# Patient Record
Sex: FEMALE | Race: WHITE
[De-identification: names, ages, dates, MRNs, and addresses within clinical notes are randomized per-mention and may not be internally consistent; named-entity substitution may affect disease eponyms.]

---

## 2022-05-26 PROBLEM — Z00.00 ENCOUNTER FOR PREVENTIVE HEALTH EXAMINATION: Status: ACTIVE | Noted: 2022-05-26

## 2023-09-07 ENCOUNTER — APPOINTMENT (OUTPATIENT)
Dept: OPHTHALMOLOGY | Facility: CLINIC | Age: 78
End: 2023-09-07
Payer: MEDICARE

## 2023-09-07 ENCOUNTER — NON-APPOINTMENT (OUTPATIENT)
Age: 78
End: 2023-09-07

## 2023-09-07 PROCEDURE — 92004 COMPRE OPH EXAM NEW PT 1/>: CPT

## 2023-09-07 PROCEDURE — 92133 CPTRZD OPH DX IMG PST SGM ON: CPT

## 2023-10-11 ENCOUNTER — APPOINTMENT (OUTPATIENT)
Dept: OPHTHALMOLOGY | Facility: CLINIC | Age: 78
End: 2023-10-11

## 2023-10-19 ENCOUNTER — NON-APPOINTMENT (OUTPATIENT)
Age: 78
End: 2023-10-19

## 2023-10-19 ENCOUNTER — APPOINTMENT (OUTPATIENT)
Dept: ORTHOPEDIC SURGERY | Facility: CLINIC | Age: 78
End: 2023-10-19
Payer: MEDICARE

## 2023-10-19 VITALS — BODY MASS INDEX: 20.04 KG/M2 | HEIGHT: 70 IN | WEIGHT: 140 LBS

## 2023-10-19 DIAGNOSIS — M25.561 PAIN IN RIGHT KNEE: ICD-10-CM

## 2023-10-19 DIAGNOSIS — M25.562 PAIN IN RIGHT KNEE: ICD-10-CM

## 2023-10-19 DIAGNOSIS — M17.11 UNILATERAL PRIMARY OSTEOARTHRITIS, RIGHT KNEE: ICD-10-CM

## 2023-10-19 PROCEDURE — 73564 X-RAY EXAM KNEE 4 OR MORE: CPT | Mod: 50

## 2023-10-19 PROCEDURE — 99204 OFFICE O/P NEW MOD 45 MIN: CPT

## 2023-10-19 RX ORDER — HYALURONATE SODIUM 20 MG/2 ML
20 SYRINGE (ML) INTRAARTICULAR
Qty: 1 | Refills: 0 | Status: ACTIVE | OUTPATIENT
Start: 2023-10-19

## 2023-10-25 ENCOUNTER — APPOINTMENT (OUTPATIENT)
Dept: OPHTHALMOLOGY | Facility: CLINIC | Age: 78
End: 2023-10-25

## 2023-11-06 ENCOUNTER — APPOINTMENT (OUTPATIENT)
Dept: ORTHOPEDIC SURGERY | Facility: CLINIC | Age: 78
End: 2023-11-06

## 2023-11-13 ENCOUNTER — APPOINTMENT (OUTPATIENT)
Dept: ORTHOPEDIC SURGERY | Facility: CLINIC | Age: 78
End: 2023-11-13
Payer: MEDICARE

## 2023-11-13 PROCEDURE — 20610 DRAIN/INJ JOINT/BURSA W/O US: CPT | Mod: LT

## 2023-11-20 ENCOUNTER — APPOINTMENT (OUTPATIENT)
Dept: ORTHOPEDIC SURGERY | Facility: CLINIC | Age: 78
End: 2023-11-20
Payer: MEDICARE

## 2023-11-20 PROCEDURE — 20610 DRAIN/INJ JOINT/BURSA W/O US: CPT | Mod: LT

## 2023-11-27 ENCOUNTER — APPOINTMENT (OUTPATIENT)
Dept: ORTHOPEDIC SURGERY | Facility: CLINIC | Age: 78
End: 2023-11-27
Payer: MEDICARE

## 2023-11-27 PROCEDURE — 20610 DRAIN/INJ JOINT/BURSA W/O US: CPT | Mod: LT

## 2024-01-08 ENCOUNTER — APPOINTMENT (OUTPATIENT)
Dept: OPHTHALMOLOGY | Facility: CLINIC | Age: 79
End: 2024-01-08

## 2024-01-11 NOTE — PHYSICAL EXAM
[de-identified] : Right lower extremity Knee Inspection: no effusion Wounds: None Alignment: Valgus Palpation: Nontender to palpation ROM active (in degrees):  with crepitus/pain through the arc of motion Ligamentous laxity: Laxity with varus valgus stress Meniscal Test: Negative McMurrays, negative Rom. Muscle Test: good quad strength.  Left lower extremity Knee Inspection: no effusion Wounds: None Alignment: Valgus Palpation: Nontender to palpation ROM active (in degrees):  with crepitus/pain through the arc of motion Ligamentous laxity: Laxity with varus valgus stress Meniscal Test: Negative McMurrays, negative Rom. Muscle Test: good quad strength. [de-identified] : 10/19/23: Bilateral knee xrays, standing AP/Lateral and Merchant films, and 45 degree PA standing view are reviewed and demonstrate diffuse tricompartmental degenerative arthritis, lateral joint space narrowing, significant valgus deformities, marginal osteophytes, bone on bone with sclerosis, patellofemoral joint space narrowing

## 2024-01-11 NOTE — HISTORY OF PRESENT ILLNESS
[de-identified] : 10/19/23: Patient presents with bilateral knee pain right worse than left.  Is been going on for many years.  States she has had injections in the past, believes they were steroids about 3 months ago but provided no relief recently.  They did used to work.  Taking Tylenol for the pain.  Lives in an assisted living.  States she has not ambulated for about a year, has been using a wheelchair due to the knee pain.  Denies allergies anticoagulation tobacco use, past medical history -A-fib.

## 2024-01-11 NOTE — DISCUSSION/SUMMARY
[de-identified] : Right knee arthritis with limitations of activities of daily living and conservative treatment options failing to improve disability. Left knee arthritis not as severe, continue conservative treatment.  We will order gel injections, patient will return once these are approved.  Plan: Right total Knee Arthroplasty  The patient has arthritis/ end stage joint disease of the knee supported by x-ray or MRI that demonstrated one of the following:  periarticular osteophytes; joint space narrowing; subchondral cysts; subchondral sclerosis;  bone on bone articulation; .   One or more of the following conservative treatments have been tried and failed for 3 months or more: analgesic medication; home exercises; brace; cortisone shots; anti-inflammatory medication; physical therapy; use of walker or cane; visco-supplementation therapies  We discussed the natural history of knee arthritis and the potential treatment options. The importance of diet and exercise was discussed as excess weight is a significant contributing factor. Due to the pain, failure of prior nonoperative treatment, the patient is indicated for a total knee replacement.   A risk/benefit analysis was discussed with the patient reviewing the advantages and disadvantages of surgical intervention at this time. Both the level and length of the patient's pain have made additional conservative treatment measures contraindicated. A full explanation was given of the nature and the purpose of the procedure and anesthesia, its benefits, possible alternative methods of treatment, the risks involved, the possibility of complications, the foreseeable consequences of the procedure and the possible results of the non-treatment. I reviewed the plan of care as well as a model of a total knee implant equivalent to the one that will be used for their replacement. The patient agrees with the plan of care as well as the use of implants for their total knee replacement.   The potential biologic and mechanical risks of the procedure were discussed. This discussion included but was not limited to thromboembolic disease, fracture, loss of fixation, infection, leg length discrepancy, dislocation and neurovascular injury. The patient is also understands that anesthesia and their comorbidities present additional risks. Proper expectations were addressed as this surgery may only partially or even fail to relieve their pain. The patient understands that additional surgery may be needed during the perioperative period or in the future. We discussed the durability of prosthetic knees and limitations related to wear, osteolysis and loosening. All questions were answered to the patient's satisfaction. The patient was given my packet of additional information about the procedure.  Expect 1-2 day stay in hospital as this is less than standard across the country.  Although outpatient surgery may be feasible in carefully selected heathy patients - the definition of a "healthy" patient to do this in has not been properly studied in randomized trials.  This hospital time is important to observe for urinary retention, neurologic decline, cardiopulmonary issues, and signs of blood clot which are frequent early complications of joint replacements.  This time will also be used to work with PT so they are safe to navigate without falling which could lead to fracture and more surgery.   There is no evidence for any of the following contraindications for total joint replacement surgery:  active infection; active systemic bacteremia; active skin infection or open wound at surgical site; neuropathic arthritis; severe, rapidly progressive neurologic disease; severe medical conditions that makes the risks of surgery outweigh the potential benefit.  The knee pain is affecting the ability to perform activities of daily living despite activity modifications.  The painful knee exam and the radiographic findings of cartilage loss are consistent with the knee OA as the source of the disability and pain.  We discussed that her valgus deformity does put her at increased risk of a peroneal nerve palsy that could result in foot drop.  We discussed this may take 6 months to a year to resolve or may never fully resolve if it does occur.  We also discussed that her with being wheelchair-bound for a year puts her at risk of poor outcomes including decreased range of motion and weakness.  I do believe the knee replacement will still help her however I discussed she will need extensive strengthening and physical therapy postoperatively and cannot expect the typical results of a knee replacement.  Patient understands this.  I am recommending the ROM tech Portable Connect device as it is medically necessary for the patient's surgical recovery to provide an optimal outcome.  Surgery will be scheduled at a convenient time.   DME: ROM Tech Preop dental, medical, cardiology clearance.  Postop DVT ppx: Per Caprini Score Abx ppx: Ancef Dressing: Prineo/Meplilex

## 2024-01-22 ENCOUNTER — APPOINTMENT (OUTPATIENT)
Dept: CARDIOLOGY | Facility: CLINIC | Age: 79
End: 2024-01-22
Payer: COMMERCIAL

## 2024-01-22 VITALS
SYSTOLIC BLOOD PRESSURE: 122 MMHG | HEIGHT: 70 IN | DIASTOLIC BLOOD PRESSURE: 90 MMHG | WEIGHT: 143 LBS | BODY MASS INDEX: 20.47 KG/M2

## 2024-01-22 DIAGNOSIS — I48.91 UNSPECIFIED ATRIAL FIBRILLATION: ICD-10-CM

## 2024-01-22 PROCEDURE — 99204 OFFICE O/P NEW MOD 45 MIN: CPT | Mod: 25

## 2024-01-22 PROCEDURE — 93000 ELECTROCARDIOGRAM COMPLETE: CPT

## 2024-01-22 RX ORDER — ACETAMINOPHEN 500 MG/1
TABLET ORAL
Refills: 0 | Status: ACTIVE | COMMUNITY

## 2024-01-22 RX ORDER — OMEGA-3/DHA/EPA/FISH OIL 910-1400MG
CAPSULE ORAL
Refills: 0 | Status: ACTIVE | COMMUNITY

## 2024-01-22 RX ORDER — CHOLECALCIFEROL (VITAMIN D3) 125 MCG
50 MCG TABLET ORAL
Refills: 0 | Status: ACTIVE | COMMUNITY

## 2024-01-22 RX ORDER — METOPROLOL SUCCINATE 25 MG/1
25 TABLET, EXTENDED RELEASE ORAL
Refills: 0 | Status: ACTIVE | COMMUNITY

## 2024-01-22 RX ORDER — APIXABAN 5 MG/1
5 TABLET, FILM COATED ORAL
Refills: 0 | Status: ACTIVE | COMMUNITY

## 2024-01-22 RX ORDER — SERTRALINE HYDROCHLORIDE 25 MG/1
TABLET, FILM COATED ORAL
Refills: 0 | Status: ACTIVE | COMMUNITY

## 2024-01-22 RX ORDER — LACTULOSE 10 G/15ML
10 SOLUTION ORAL; RECTAL
Refills: 0 | Status: ACTIVE | COMMUNITY

## 2024-01-22 RX ORDER — POLYETHYLENE GLYCOL 3350 17 G/17G
17 POWDER, FOR SOLUTION ORAL
Refills: 0 | Status: ACTIVE | COMMUNITY

## 2024-01-22 NOTE — PHYSICAL EXAM
[Well Developed] : well developed [Well Nourished] : well nourished [No Acute Distress] : no acute distress [Normal Conjunctiva] : normal conjunctiva [Normal Venous Pressure] : normal venous pressure [No Carotid Bruit] : no carotid bruit [Normal S1, S2] : normal S1, S2 [No Murmur] : no murmur [No Rub] : no rub [No Gallop] : no gallop [Clear Lung Fields] : clear lung fields [Good Air Entry] : good air entry [No Respiratory Distress] : no respiratory distress  [Soft] : abdomen soft [Non Tender] : non-tender [No Masses/organomegaly] : no masses/organomegaly [Normal Bowel Sounds] : normal bowel sounds [No Edema] : no edema [No Cyanosis] : no cyanosis [No Clubbing] : no clubbing [No Varicosities] : no varicosities [No Rash] : no rash [No Skin Lesions] : no skin lesions [Moves all extremities] : moves all extremities [No Focal Deficits] : no focal deficits [Normal Speech] : normal speech [Alert and Oriented] : alert and oriented [Normal memory] : normal memory [de-identified] : ambulates with wheelchair

## 2024-01-22 NOTE — DISCUSSION/SUMMARY
[FreeTextEntry1] : Pt is a 77 y/o F PMH AF on Eliquis, PVD, [place] (states she had a fall about 2 yrs ago and had no one to take care of her).  Pt is a poor historian - history obtained from chart that aid brings She is scheduled for R knee replacement 02/02/2024 at .   Will check transthoracic echocardiogram to evaluate left ventricular function and assess for any structural abnormalities  check pharm nuc stress test to eval for ischemia Further preop recommendations will be made once diagnostic testing is complete.   AF: c/w Eliquis for AC c/w metoprolol for rate control will try to get any previous records  The described plan was discussed with the pt and aide.  All questions and concerns were addressed to the best of my knowledge.  [EKG obtained to assist in diagnosis and management of assessed problem(s)] : EKG obtained to assist in diagnosis and management of assessed problem(s)

## 2024-01-22 NOTE — HISTORY OF PRESENT ILLNESS
[FreeTextEntry1] : Pt is a 79 y/o F who is referred here today by their PCP for evaluation. Pt has PMH AF on Elitaye, PVD, [place] (states she had a fall about 2 yrs ago and had no one to take care of her).  Pt is a poor historian - history obtained from chart that aid brings She is scheduled for R knee replacement 02/02/2024 at .  She tells me that she is feeling well overall - denies CP, SOB, diaphoresis, palpitations, dizziness, syncope, LE edema, PND, orthopnea.  She also mentions that she saw a cardiologist 6 months ago but does not remember name ambulates with wheelchair   PCP Dr Lauren Family hx: no cardiac disease Smoking status: quit 2022 ETOH abuse quit 2022 drank wine no drug use Current exercise: none Daily water intake: 1.5 glasses  Previous cardiac testing: unknown Previous hospitalizations/surgeries: ulnar nerve surgery - no problems with anesthesia 3 children - no problem with pregnancies

## 2024-01-23 ENCOUNTER — APPOINTMENT (OUTPATIENT)
Dept: CARDIOLOGY | Facility: CLINIC | Age: 79
End: 2024-01-23
Payer: COMMERCIAL

## 2024-01-23 PROCEDURE — 93306 TTE W/DOPPLER COMPLETE: CPT

## 2024-02-07 ENCOUNTER — RESULT REVIEW (OUTPATIENT)
Age: 79
End: 2024-02-07

## 2024-02-07 ENCOUNTER — OUTPATIENT (OUTPATIENT)
Dept: OUTPATIENT SERVICES | Facility: HOSPITAL | Age: 79
LOS: 1 days | End: 2024-02-07
Payer: MEDICARE

## 2024-02-07 VITALS
TEMPERATURE: 98 F | DIASTOLIC BLOOD PRESSURE: 90 MMHG | HEART RATE: 74 BPM | RESPIRATION RATE: 18 BRPM | HEIGHT: 69 IN | SYSTOLIC BLOOD PRESSURE: 143 MMHG | OXYGEN SATURATION: 97 % | WEIGHT: 132.06 LBS

## 2024-02-07 DIAGNOSIS — Z89.429 ACQUIRED ABSENCE OF OTHER TOE(S), UNSPECIFIED SIDE: Chronic | ICD-10-CM

## 2024-02-07 DIAGNOSIS — Z98.890 OTHER SPECIFIED POSTPROCEDURAL STATES: Chronic | ICD-10-CM

## 2024-02-07 DIAGNOSIS — M17.11 UNILATERAL PRIMARY OSTEOARTHRITIS, RIGHT KNEE: ICD-10-CM

## 2024-02-07 DIAGNOSIS — Z29.9 ENCOUNTER FOR PROPHYLACTIC MEASURES, UNSPECIFIED: ICD-10-CM

## 2024-02-07 DIAGNOSIS — Z01.818 ENCOUNTER FOR OTHER PREPROCEDURAL EXAMINATION: ICD-10-CM

## 2024-02-07 LAB
A1C WITH ESTIMATED AVERAGE GLUCOSE RESULT: 5.1 % — SIGNIFICANT CHANGE UP (ref 4–5.6)
ALBUMIN SERPL ELPH-MCNC: 4.2 G/DL — SIGNIFICANT CHANGE UP (ref 3.3–5)
BASOPHILS # BLD AUTO: 0.07 K/UL — SIGNIFICANT CHANGE UP (ref 0–0.2)
BASOPHILS NFR BLD AUTO: 1.2 % — SIGNIFICANT CHANGE UP (ref 0–2)
BUN SERPL-MCNC: 30 MG/DL — HIGH (ref 7–23)
CALCIUM SERPL-MCNC: 9.7 MG/DL — SIGNIFICANT CHANGE UP (ref 8.5–10.1)
CHLORIDE SERPL-SCNC: 104 MMOL/L — SIGNIFICANT CHANGE UP (ref 96–108)
CO2 SERPL-SCNC: 31 MMOL/L — SIGNIFICANT CHANGE UP (ref 22–31)
CREAT SERPL-MCNC: 1.14 MG/DL — SIGNIFICANT CHANGE UP (ref 0.5–1.3)
EGFR: 50 ML/MIN/1.73M2 — LOW
EOSINOPHIL # BLD AUTO: 0.14 K/UL — SIGNIFICANT CHANGE UP (ref 0–0.5)
EOSINOPHIL NFR BLD AUTO: 2.3 % — SIGNIFICANT CHANGE UP (ref 0–6)
ESTIMATED AVERAGE GLUCOSE: 100 MG/DL — SIGNIFICANT CHANGE UP (ref 68–114)
GLUCOSE SERPL-MCNC: 104 MG/DL — HIGH (ref 70–99)
HCT VFR BLD CALC: 41.3 % — SIGNIFICANT CHANGE UP (ref 34.5–45)
HGB BLD-MCNC: 13.9 G/DL — SIGNIFICANT CHANGE UP (ref 11.5–15.5)
IMM GRANULOCYTES NFR BLD AUTO: 0.2 % — SIGNIFICANT CHANGE UP (ref 0–0.9)
INR BLD: 1.8 RATIO — HIGH (ref 0.85–1.18)
LYMPHOCYTES # BLD AUTO: 1.13 K/UL — SIGNIFICANT CHANGE UP (ref 1–3.3)
LYMPHOCYTES # BLD AUTO: 18.8 % — SIGNIFICANT CHANGE UP (ref 13–44)
MCHC RBC-ENTMCNC: 30.7 PG — SIGNIFICANT CHANGE UP (ref 27–34)
MCHC RBC-ENTMCNC: 33.7 GM/DL — SIGNIFICANT CHANGE UP (ref 32–36)
MCV RBC AUTO: 91.2 FL — SIGNIFICANT CHANGE UP (ref 80–100)
MONOCYTES # BLD AUTO: 0.44 K/UL — SIGNIFICANT CHANGE UP (ref 0–0.9)
MONOCYTES NFR BLD AUTO: 7.3 % — SIGNIFICANT CHANGE UP (ref 2–14)
NEUTROPHILS # BLD AUTO: 4.22 K/UL — SIGNIFICANT CHANGE UP (ref 1.8–7.4)
NEUTROPHILS NFR BLD AUTO: 70.2 % — SIGNIFICANT CHANGE UP (ref 43–77)
PLATELET # BLD AUTO: 195 K/UL — SIGNIFICANT CHANGE UP (ref 150–400)
POTASSIUM SERPL-MCNC: 4.4 MMOL/L — SIGNIFICANT CHANGE UP (ref 3.5–5.3)
POTASSIUM SERPL-SCNC: 4.4 MMOL/L — SIGNIFICANT CHANGE UP (ref 3.5–5.3)
PROTHROM AB SERPL-ACNC: 20 SEC — HIGH (ref 9.5–13)
RBC # BLD: 4.53 M/UL — SIGNIFICANT CHANGE UP (ref 3.8–5.2)
RBC # FLD: 12.9 % — SIGNIFICANT CHANGE UP (ref 10.3–14.5)
SODIUM SERPL-SCNC: 135 MMOL/L — SIGNIFICANT CHANGE UP (ref 135–145)
WBC # BLD: 6.01 K/UL — SIGNIFICANT CHANGE UP (ref 3.8–10.5)
WBC # FLD AUTO: 6.01 K/UL — SIGNIFICANT CHANGE UP (ref 3.8–10.5)

## 2024-02-07 PROCEDURE — 85025 COMPLETE CBC W/AUTO DIFF WBC: CPT

## 2024-02-07 PROCEDURE — 93010 ELECTROCARDIOGRAM REPORT: CPT

## 2024-02-07 PROCEDURE — 71045 X-RAY EXAM CHEST 1 VIEW: CPT

## 2024-02-07 PROCEDURE — 87641 MR-STAPH DNA AMP PROBE: CPT

## 2024-02-07 PROCEDURE — 71045 X-RAY EXAM CHEST 1 VIEW: CPT | Mod: 26

## 2024-02-07 PROCEDURE — 87640 STAPH A DNA AMP PROBE: CPT

## 2024-02-07 PROCEDURE — 80048 BASIC METABOLIC PNL TOTAL CA: CPT

## 2024-02-07 PROCEDURE — 36415 COLL VENOUS BLD VENIPUNCTURE: CPT

## 2024-02-07 PROCEDURE — 99214 OFFICE O/P EST MOD 30 MIN: CPT | Mod: 25

## 2024-02-07 PROCEDURE — 82040 ASSAY OF SERUM ALBUMIN: CPT

## 2024-02-07 PROCEDURE — 93005 ELECTROCARDIOGRAM TRACING: CPT

## 2024-02-07 PROCEDURE — 85610 PROTHROMBIN TIME: CPT

## 2024-02-07 PROCEDURE — 83036 HEMOGLOBIN GLYCOSYLATED A1C: CPT

## 2024-02-07 NOTE — H&P PST ADULT - NSANTHOSAYNRD_GEN_A_CORE
No. HERMILO screening performed.  STOP BANG Legend: 0-2 = LOW Risk; 3-4 = INTERMEDIATE Risk; 5-8 = HIGH Risk

## 2024-02-07 NOTE — H&P PST ADULT - HISTORY OF PRESENT ILLNESS
78 years old female with osteoarthritis of right knee. Poor historian. History of HTN, HLD , AFib , Cognitive communication deficit. Reports pain to both knees. Pain with weight bearing. Uses a wheelchair. Reports frequent falls in the past. Presently resides in a multi care nursing facility after fall. Planned right knee replacement.

## 2024-02-07 NOTE — H&P PST ADULT - NSICDXPASTMEDICALHX_GEN_ALL_CORE_FT
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

## 2024-02-07 NOTE — H&P PST ADULT - CARDIOVASCULAR COMMENTS
HTN, HLD, Atrial fibrillation . PVD- left fifth metatarsal amputation cardiac evaluation done by Dr. Reed

## 2024-02-07 NOTE — H&P PST ADULT - ASSESSMENT
78 years old female present to PST prior to  right total knee replacement with Dr. Elizondo. Patient in a rehab facility which uses [facility].  Spoke with Jeimy the nurse at the facility and medications verified.     Patient will require a rolling walker at home due to their dx of osteoarthritis of knee to help complete the MRADL's.    Patient denies signs and symptoms of Covid 19 such as shortness of breath/ difficulty breathing, fever/chills, cough, muscle /body ache, headache, loss of taste or smell.    Plan   1. Education and Instructions given to patient regarding upcoming surgery  2. NPO as per ASU  3. Take the following medications with sips of water on the day of procedure:  Metoprolol, Sertraline  4. Use E-Z sponge as directed  5. Use Mupirocin as directed.  6. Drink a quart of extra  fluids the day before your surgery.  7. Medical optimization for surgery with Dr. Lauren (Rehab facility) and cardiac evaluation with Dr. Reed  8. CBC, BMP, HGB A1C, Albumin, PT/ INR and PTT, MRSA done in [facility] and sent to lab  9. EKG and Chest x- ray done in [facility]  10. Do not take NSAIDS, Aspirin, Herbal supplements, Multivitamins, Vitamin E or Fish oil 7 days prior to surgery  11. Eliquis and Aspirin managed by Dr. Rede  CAPMICHAELI SCORE [CLOT]    AGE RELATED RISK FACTORS                                                       MOBILITY RELATED FACTORS  [ ] Age 41-60 years                                            (1 Point)                  [ ] Bed rest                                                        (1 Point)  [ ] Age: 61-74 years                                           (2 Points)                 [ ] Plaster cast                                                   (2 Points)  [x ] Age> 75 years                                              (3 Points)                 [ ] Bed bound for more than 72 hours                 (2 Points)    DISEASE RELATED RISK FACTORS                                               GENDER SPECIFIC FACTORS  [ ] Edema in the lower extremities                       (1 Point)                  [ ] Pregnancy                                                     (1 Point)  [ ] Varicose veins                                               (1 Point)                  [ ] Post-partum < 6 weeks                                   (1 Point)             [ ] BMI > 25 Kg/m2                                            (1 Point)                  [ ] Hormonal therapy  or oral contraception          (1 Point)                 [ ] Sepsis (in the previous month)                        (1 Point)                  [ ] History of pregnancy complications                 (1 point)  [ ] Pneumonia or serious lung disease                                               [ ] Unexplained or recurrent                     (1 Point)           (in the previous month)                               (1 Point)  [ ] Abnormal pulmonary function test                     (1 Point)                 SURGERY RELATED RISK FACTORS  [ ] Acute myocardial infarction                              (1 Point)                 [ ]  Section                                             (1 Point)  [ ] Congestive heart failure (in the previous month)  (1 Point)               [ ] Minor surgery                                                  (1 Point)   [ ] Inflammatory bowel disease                             (1 Point)                 [ ] Arthroscopic surgery                                        (2 Points)  [ ] Central venous access                                      (2 Points)                [ ] General surgery lasting more than 45 minutes   (2 Points)       [ ] Stroke (in the previous month)                          (5 Points)               [x ] Elective arthroplasty                                         (5 Points)            [ ] malignancy present or previous                      (2 points)                                                                                                                                   HEMATOLOGY RELATED FACTORS                                                 TRAUMA RELATED RISK FACTORS  [ ] Prior episodes of VTE                                     (3 Points)                 [ ] Fracture of the hip, pelvis, or leg                       (5 Points)  [ ] Positive family history for VTE                         (3 Points)                 [ ] Acute spinal cord injury (in the previous month)  (5 Points)  [ ] Prothrombin 02538 A                                     (3 Points)                 [ ] Paralysis  (less than 1 month)                             (5 Points)  [ ] Factor V Leiden                                             (3 Points)                  [ ] Multiple Trauma within 1 month                        (5 Points)  [ ] Lupus anticoagulants                                     (3 Points)                                                           [ ] Anticardiolipin antibodies                               (3 Points)                                                       [ ] High homocysteine in the blood                      (3 Points)                                             [ ] Other congenital or acquired thrombophilia      (3 Points)                                                [ ] Heparin induced thrombocytopenia                  (3 Points)                                          Total Score [    8      ]

## 2024-02-07 NOTE — H&P PST ADULT - NSICDXFAMILYHX_GEN_ALL_CORE_FT
FAMILY HISTORY:  Father  Still living? No  Family hx of alcoholism, Age at diagnosis: Age Unknown    Sibling  Still living? Yes, Estimated age: Age Unknown  Family history of heart disease, Age at diagnosis: Age Unknown    Child  Still living? No  Family history of heart attack, Age at diagnosis: Age Unknown

## 2024-02-08 DIAGNOSIS — Z01.818 ENCOUNTER FOR OTHER PREPROCEDURAL EXAMINATION: ICD-10-CM

## 2024-02-08 DIAGNOSIS — M17.11 UNILATERAL PRIMARY OSTEOARTHRITIS, RIGHT KNEE: ICD-10-CM

## 2024-02-08 LAB
MRSA PCR RESULT.: SIGNIFICANT CHANGE UP
S AUREUS DNA NOSE QL NAA+PROBE: SIGNIFICANT CHANGE UP

## 2024-02-14 ENCOUNTER — APPOINTMENT (OUTPATIENT)
Dept: OPHTHALMOLOGY | Facility: CLINIC | Age: 79
End: 2024-02-14

## 2024-02-14 PROBLEM — E78.5 HYPERLIPIDEMIA, UNSPECIFIED: Chronic | Status: ACTIVE | Noted: 2024-02-07

## 2024-02-14 PROBLEM — R26.2 DIFFICULTY IN WALKING, NOT ELSEWHERE CLASSIFIED: Chronic | Status: ACTIVE | Noted: 2024-02-07

## 2024-02-14 PROBLEM — I73.9 PERIPHERAL VASCULAR DISEASE, UNSPECIFIED: Chronic | Status: ACTIVE | Noted: 2024-02-07

## 2024-02-14 PROBLEM — M17.0 BILATERAL PRIMARY OSTEOARTHRITIS OF KNEE: Chronic | Status: ACTIVE | Noted: 2024-02-07

## 2024-02-14 PROBLEM — R41.841 COGNITIVE COMMUNICATION DEFICIT: Chronic | Status: ACTIVE | Noted: 2024-02-07

## 2024-02-14 PROBLEM — I10 ESSENTIAL (PRIMARY) HYPERTENSION: Chronic | Status: ACTIVE | Noted: 2024-02-07

## 2024-02-14 PROBLEM — E55.9 VITAMIN D DEFICIENCY, UNSPECIFIED: Chronic | Status: ACTIVE | Noted: 2024-02-07

## 2024-02-14 PROBLEM — K59.00 CONSTIPATION, UNSPECIFIED: Chronic | Status: ACTIVE | Noted: 2024-02-07

## 2024-02-14 PROBLEM — F10.21 ALCOHOL DEPENDENCE, IN REMISSION: Chronic | Status: ACTIVE | Noted: 2024-02-07

## 2024-02-14 PROBLEM — F32.9 MAJOR DEPRESSIVE DISORDER, SINGLE EPISODE, UNSPECIFIED: Chronic | Status: ACTIVE | Noted: 2024-02-07

## 2024-02-14 PROBLEM — I71.40 ABDOMINAL AORTIC ANEURYSM, WITHOUT RUPTURE, UNSPECIFIED: Chronic | Status: ACTIVE | Noted: 2024-02-07

## 2024-02-14 PROBLEM — G89.4 CHRONIC PAIN SYNDROME: Chronic | Status: ACTIVE | Noted: 2024-02-07

## 2024-02-14 PROBLEM — I48.91 UNSPECIFIED ATRIAL FIBRILLATION: Chronic | Status: ACTIVE | Noted: 2024-02-07

## 2024-02-14 PROBLEM — Z91.81 HISTORY OF FALLING: Chronic | Status: ACTIVE | Noted: 2024-02-07

## 2024-02-14 PROBLEM — M81.0 AGE-RELATED OSTEOPOROSIS WITHOUT CURRENT PATHOLOGICAL FRACTURE: Chronic | Status: ACTIVE | Noted: 2024-02-07

## 2024-02-14 PROBLEM — Z89.422 ACQUIRED ABSENCE OF OTHER LEFT TOE(S): Chronic | Status: ACTIVE | Noted: 2024-02-07

## 2024-02-14 PROBLEM — M25.561 PAIN IN RIGHT KNEE: Chronic | Status: ACTIVE | Noted: 2024-02-07

## 2024-02-19 RX ORDER — ASCORBIC ACID 60 MG
500 TABLET,CHEWABLE ORAL
Refills: 0 | Status: DISCONTINUED | OUTPATIENT
Start: 2024-02-20 | End: 2024-02-23

## 2024-02-19 RX ORDER — ASPIRIN/CALCIUM CARB/MAGNESIUM 324 MG
81 TABLET ORAL DAILY
Refills: 0 | Status: DISCONTINUED | OUTPATIENT
Start: 2024-02-21 | End: 2024-02-23

## 2024-02-19 RX ORDER — ONDANSETRON 8 MG/1
8 TABLET, FILM COATED ORAL EVERY 8 HOURS
Refills: 0 | Status: DISCONTINUED | OUTPATIENT
Start: 2024-02-20 | End: 2024-02-23

## 2024-02-19 RX ORDER — HYDROMORPHONE HYDROCHLORIDE 2 MG/ML
0.5 INJECTION INTRAMUSCULAR; INTRAVENOUS; SUBCUTANEOUS EVERY 4 HOURS
Refills: 0 | Status: DISCONTINUED | OUTPATIENT
Start: 2024-02-20 | End: 2024-02-23

## 2024-02-19 RX ORDER — PROCHLORPERAZINE MALEATE 5 MG
10 TABLET ORAL EVERY 8 HOURS
Refills: 0 | Status: DISCONTINUED | OUTPATIENT
Start: 2024-02-20 | End: 2024-02-23

## 2024-02-19 RX ORDER — POLYETHYLENE GLYCOL 3350 17 G/17G
17 POWDER, FOR SOLUTION ORAL AT BEDTIME
Refills: 0 | Status: DISCONTINUED | OUTPATIENT
Start: 2024-02-20 | End: 2024-02-23

## 2024-02-19 RX ORDER — TRANEXAMIC ACID 100 MG/ML
1000 INJECTION, SOLUTION INTRAVENOUS ONCE
Refills: 0 | Status: DISCONTINUED | OUTPATIENT
Start: 2024-02-20 | End: 2024-02-23

## 2024-02-19 RX ORDER — FOLIC ACID 0.8 MG
1 TABLET ORAL DAILY
Refills: 0 | Status: DISCONTINUED | OUTPATIENT
Start: 2024-02-20 | End: 2024-02-23

## 2024-02-19 RX ORDER — PANTOPRAZOLE SODIUM 20 MG/1
40 TABLET, DELAYED RELEASE ORAL DAILY
Refills: 0 | Status: DISCONTINUED | OUTPATIENT
Start: 2024-02-20 | End: 2024-02-23

## 2024-02-19 RX ORDER — ACETAMINOPHEN 500 MG
1000 TABLET ORAL EVERY 8 HOURS
Refills: 0 | Status: DISCONTINUED | OUTPATIENT
Start: 2024-02-20 | End: 2024-02-23

## 2024-02-19 RX ORDER — OXYCODONE HYDROCHLORIDE 5 MG/1
5 TABLET ORAL EVERY 4 HOURS
Refills: 0 | Status: DISCONTINUED | OUTPATIENT
Start: 2024-02-20 | End: 2024-02-23

## 2024-02-19 RX ORDER — SENNA PLUS 8.6 MG/1
2 TABLET ORAL AT BEDTIME
Refills: 0 | Status: DISCONTINUED | OUTPATIENT
Start: 2024-02-20 | End: 2024-02-23

## 2024-02-19 RX ORDER — OXYCODONE HYDROCHLORIDE 5 MG/1
10 TABLET ORAL EVERY 4 HOURS
Refills: 0 | Status: DISCONTINUED | OUTPATIENT
Start: 2024-02-20 | End: 2024-02-23

## 2024-02-19 RX ORDER — CELECOXIB 200 MG/1
200 CAPSULE ORAL EVERY 12 HOURS
Refills: 0 | Status: DISCONTINUED | OUTPATIENT
Start: 2024-02-20 | End: 2024-02-22

## 2024-02-19 RX ORDER — FERROUS SULFATE 325(65) MG
325 TABLET ORAL DAILY
Refills: 0 | Status: DISCONTINUED | OUTPATIENT
Start: 2024-02-20 | End: 2024-02-23

## 2024-02-19 RX ORDER — SODIUM CHLORIDE 9 MG/ML
1000 INJECTION, SOLUTION INTRAVENOUS
Refills: 0 | Status: DISCONTINUED | OUTPATIENT
Start: 2024-02-20 | End: 2024-02-23

## 2024-02-20 ENCOUNTER — APPOINTMENT (OUTPATIENT)
Dept: ORTHOPEDIC SURGERY | Facility: HOSPITAL | Age: 79
End: 2024-02-20

## 2024-02-20 ENCOUNTER — RESULT REVIEW (OUTPATIENT)
Age: 79
End: 2024-02-20

## 2024-02-20 ENCOUNTER — TRANSCRIPTION ENCOUNTER (OUTPATIENT)
Age: 79
End: 2024-02-20

## 2024-02-20 ENCOUNTER — INPATIENT (INPATIENT)
Facility: HOSPITAL | Age: 79
LOS: 2 days | Discharge: HOME | DRG: 470 | End: 2024-02-23
Attending: STUDENT IN AN ORGANIZED HEALTH CARE EDUCATION/TRAINING PROGRAM | Admitting: STUDENT IN AN ORGANIZED HEALTH CARE EDUCATION/TRAINING PROGRAM
Payer: COMMERCIAL

## 2024-02-20 VITALS
HEIGHT: 70 IN | WEIGHT: 143.08 LBS | DIASTOLIC BLOOD PRESSURE: 113 MMHG | HEART RATE: 42 BPM | TEMPERATURE: 98 F | SYSTOLIC BLOOD PRESSURE: 174 MMHG | RESPIRATION RATE: 15 BRPM | OXYGEN SATURATION: 99 %

## 2024-02-20 DIAGNOSIS — M17.11 UNILATERAL PRIMARY OSTEOARTHRITIS, RIGHT KNEE: ICD-10-CM

## 2024-02-20 DIAGNOSIS — Z89.429 ACQUIRED ABSENCE OF OTHER TOE(S), UNSPECIFIED SIDE: Chronic | ICD-10-CM

## 2024-02-20 DIAGNOSIS — Z98.890 OTHER SPECIFIED POSTPROCEDURAL STATES: Chronic | ICD-10-CM

## 2024-02-20 LAB
ANION GAP SERPL CALC-SCNC: 5 MMOL/L — SIGNIFICANT CHANGE UP (ref 5–17)
BUN SERPL-MCNC: 28 MG/DL — HIGH (ref 7–23)
CALCIUM SERPL-MCNC: 9.4 MG/DL — SIGNIFICANT CHANGE UP (ref 8.5–10.1)
CHLORIDE SERPL-SCNC: 104 MMOL/L — SIGNIFICANT CHANGE UP (ref 96–108)
CO2 SERPL-SCNC: 27 MMOL/L — SIGNIFICANT CHANGE UP (ref 22–31)
CREAT SERPL-MCNC: 1.08 MG/DL — SIGNIFICANT CHANGE UP (ref 0.5–1.3)
EGFR: 53 ML/MIN/1.73M2 — LOW
GLUCOSE BLDC GLUCOMTR-MCNC: 139 MG/DL — HIGH (ref 70–99)
GLUCOSE BLDC GLUCOMTR-MCNC: 87 MG/DL — SIGNIFICANT CHANGE UP (ref 70–99)
GLUCOSE SERPL-MCNC: 145 MG/DL — HIGH (ref 70–99)
HCT VFR BLD CALC: 37.7 % — SIGNIFICANT CHANGE UP (ref 34.5–45)
HGB BLD-MCNC: 12.5 G/DL — SIGNIFICANT CHANGE UP (ref 11.5–15.5)
MCHC RBC-ENTMCNC: 30.4 PG — SIGNIFICANT CHANGE UP (ref 27–34)
MCHC RBC-ENTMCNC: 33.2 GM/DL — SIGNIFICANT CHANGE UP (ref 32–36)
MCV RBC AUTO: 91.7 FL — SIGNIFICANT CHANGE UP (ref 80–100)
PLATELET # BLD AUTO: 176 K/UL — SIGNIFICANT CHANGE UP (ref 150–400)
POTASSIUM SERPL-MCNC: 4.1 MMOL/L — SIGNIFICANT CHANGE UP (ref 3.5–5.3)
POTASSIUM SERPL-SCNC: 4.1 MMOL/L — SIGNIFICANT CHANGE UP (ref 3.5–5.3)
RBC # BLD: 4.11 M/UL — SIGNIFICANT CHANGE UP (ref 3.8–5.2)
RBC # FLD: 12.7 % — SIGNIFICANT CHANGE UP (ref 10.3–14.5)
SODIUM SERPL-SCNC: 136 MMOL/L — SIGNIFICANT CHANGE UP (ref 135–145)
WBC # BLD: 7.36 K/UL — SIGNIFICANT CHANGE UP (ref 3.8–10.5)
WBC # FLD AUTO: 7.36 K/UL — SIGNIFICANT CHANGE UP (ref 3.8–10.5)

## 2024-02-20 PROCEDURE — 97110 THERAPEUTIC EXERCISES: CPT | Mod: GP

## 2024-02-20 PROCEDURE — 99024 POSTOP FOLLOW-UP VISIT: CPT

## 2024-02-20 PROCEDURE — 97116 GAIT TRAINING THERAPY: CPT | Mod: GP

## 2024-02-20 PROCEDURE — C1713: CPT

## 2024-02-20 PROCEDURE — 97162 PT EVAL MOD COMPLEX 30 MIN: CPT | Mod: GP

## 2024-02-20 PROCEDURE — 85027 COMPLETE CBC AUTOMATED: CPT

## 2024-02-20 PROCEDURE — 73560 X-RAY EXAM OF KNEE 1 OR 2: CPT | Mod: RT

## 2024-02-20 PROCEDURE — 80048 BASIC METABOLIC PNL TOTAL CA: CPT

## 2024-02-20 PROCEDURE — 27447 TOTAL KNEE ARTHROPLASTY: CPT | Mod: RT

## 2024-02-20 PROCEDURE — 97607 NEG PRS WND THR NDME<=50SQCM: CPT

## 2024-02-20 PROCEDURE — C1776: CPT

## 2024-02-20 PROCEDURE — 73560 X-RAY EXAM OF KNEE 1 OR 2: CPT | Mod: 26,RT

## 2024-02-20 PROCEDURE — 36415 COLL VENOUS BLD VENIPUNCTURE: CPT

## 2024-02-20 PROCEDURE — 97530 THERAPEUTIC ACTIVITIES: CPT | Mod: GP

## 2024-02-20 PROCEDURE — 82962 GLUCOSE BLOOD TEST: CPT

## 2024-02-20 PROCEDURE — 88300 SURGICAL PATH GROSS: CPT | Mod: 26

## 2024-02-20 PROCEDURE — 88300 SURGICAL PATH GROSS: CPT

## 2024-02-20 PROCEDURE — 99222 1ST HOSP IP/OBS MODERATE 55: CPT

## 2024-02-20 PROCEDURE — 20985 CPTR-ASST DIR MS PX: CPT

## 2024-02-20 RX ORDER — CEFAZOLIN SODIUM 1 G
2000 VIAL (EA) INJECTION EVERY 8 HOURS
Refills: 0 | Status: DISCONTINUED | OUTPATIENT
Start: 2024-02-20 | End: 2024-02-20

## 2024-02-20 RX ORDER — ACETAMINOPHEN 500 MG
2 TABLET ORAL
Qty: 84 | Refills: 0
Start: 2024-02-20 | End: 2024-03-04

## 2024-02-20 RX ORDER — APIXABAN 2.5 MG/1
5 TABLET, FILM COATED ORAL ONCE
Refills: 0 | Status: DISCONTINUED | OUTPATIENT
Start: 2024-02-20 | End: 2024-02-20

## 2024-02-20 RX ORDER — CEFAZOLIN SODIUM 1 G
2000 VIAL (EA) INJECTION EVERY 8 HOURS
Refills: 0 | Status: COMPLETED | OUTPATIENT
Start: 2024-02-20 | End: 2024-02-21

## 2024-02-20 RX ORDER — APIXABAN 2.5 MG/1
2.5 TABLET, FILM COATED ORAL ONCE
Refills: 0 | Status: COMPLETED | OUTPATIENT
Start: 2024-02-20 | End: 2024-02-20

## 2024-02-20 RX ORDER — APIXABAN 2.5 MG/1
2.5 TABLET, FILM COATED ORAL EVERY 12 HOURS
Refills: 0 | Status: DISCONTINUED | OUTPATIENT
Start: 2024-02-21 | End: 2024-02-21

## 2024-02-20 RX ORDER — SERTRALINE 25 MG/1
25 TABLET, FILM COATED ORAL DAILY
Refills: 0 | Status: DISCONTINUED | OUTPATIENT
Start: 2024-02-20 | End: 2024-02-23

## 2024-02-20 RX ORDER — METOPROLOL TARTRATE 50 MG
25 TABLET ORAL DAILY
Refills: 0 | Status: DISCONTINUED | OUTPATIENT
Start: 2024-02-20 | End: 2024-02-23

## 2024-02-20 RX ORDER — OXYCODONE HYDROCHLORIDE 5 MG/1
10 TABLET ORAL ONCE
Refills: 0 | Status: DISCONTINUED | OUTPATIENT
Start: 2024-02-20 | End: 2024-02-20

## 2024-02-20 RX ORDER — APIXABAN 2.5 MG/1
1 TABLET, FILM COATED ORAL
Refills: 0 | DISCHARGE

## 2024-02-20 RX ORDER — OXYCODONE HYDROCHLORIDE 5 MG/1
5 TABLET ORAL ONCE
Refills: 0 | Status: DISCONTINUED | OUTPATIENT
Start: 2024-02-20 | End: 2024-02-20

## 2024-02-20 RX ORDER — OMEGA-3 ACID ETHYL ESTERS 1 G
1 CAPSULE ORAL
Refills: 0 | DISCHARGE

## 2024-02-20 RX ORDER — CEPHALEXIN 500 MG
250 CAPSULE ORAL EVERY 6 HOURS
Refills: 0 | Status: DISCONTINUED | OUTPATIENT
Start: 2024-02-21 | End: 2024-02-23

## 2024-02-20 RX ORDER — ACETAMINOPHEN 500 MG
2 TABLET ORAL
Refills: 0 | DISCHARGE

## 2024-02-20 RX ORDER — ASPIRIN/CALCIUM CARB/MAGNESIUM 324 MG
1 TABLET ORAL
Refills: 0 | DISCHARGE

## 2024-02-20 RX ORDER — ONDANSETRON 8 MG/1
4 TABLET, FILM COATED ORAL ONCE
Refills: 0 | Status: DISCONTINUED | OUTPATIENT
Start: 2024-02-20 | End: 2024-02-20

## 2024-02-20 RX ORDER — LACTULOSE 10 G/15ML
15 SOLUTION ORAL
Refills: 0 | DISCHARGE

## 2024-02-20 RX ORDER — DEXAMETHASONE 0.5 MG/5ML
8 ELIXIR ORAL ONCE
Refills: 0 | Status: COMPLETED | OUTPATIENT
Start: 2024-02-21 | End: 2024-02-21

## 2024-02-20 RX ORDER — OXYCODONE HYDROCHLORIDE 5 MG/1
1 TABLET ORAL
Qty: 30 | Refills: 0
Start: 2024-02-20 | End: 2024-02-24

## 2024-02-20 RX ORDER — HYDRALAZINE HCL 50 MG
10 TABLET ORAL ONCE
Refills: 0 | Status: COMPLETED | OUTPATIENT
Start: 2024-02-20 | End: 2024-02-20

## 2024-02-20 RX ORDER — ERGOCALCIFEROL 1.25 MG/1
1 CAPSULE ORAL
Refills: 0 | DISCHARGE

## 2024-02-20 RX ORDER — POLYETHYLENE GLYCOL 3350 17 G/17G
17 POWDER, FOR SOLUTION ORAL
Refills: 0 | DISCHARGE

## 2024-02-20 RX ORDER — PANTOPRAZOLE SODIUM 20 MG/1
1 TABLET, DELAYED RELEASE ORAL
Qty: 30 | Refills: 0
Start: 2024-02-20 | End: 2024-03-20

## 2024-02-20 RX ORDER — HYDROMORPHONE HYDROCHLORIDE 2 MG/ML
0.5 INJECTION INTRAMUSCULAR; INTRAVENOUS; SUBCUTANEOUS
Refills: 0 | Status: DISCONTINUED | OUTPATIENT
Start: 2024-02-20 | End: 2024-02-20

## 2024-02-20 RX ORDER — LACTULOSE 10 G/15ML
10 SOLUTION ORAL DAILY
Refills: 0 | Status: DISCONTINUED | OUTPATIENT
Start: 2024-02-20 | End: 2024-02-23

## 2024-02-20 RX ORDER — SERTRALINE 25 MG/1
1 TABLET, FILM COATED ORAL
Refills: 0 | DISCHARGE

## 2024-02-20 RX ORDER — DIPHENHYDRAMINE HCL 50 MG
10 CAPSULE ORAL ONCE
Refills: 0 | Status: COMPLETED | OUTPATIENT
Start: 2024-02-20 | End: 2024-02-20

## 2024-02-20 RX ORDER — PANTOPRAZOLE SODIUM 20 MG/1
40 TABLET, DELAYED RELEASE ORAL ONCE
Refills: 0 | Status: COMPLETED | OUTPATIENT
Start: 2024-02-20 | End: 2024-02-20

## 2024-02-20 RX ORDER — SODIUM CHLORIDE 9 MG/ML
1000 INJECTION, SOLUTION INTRAVENOUS
Refills: 0 | Status: DISCONTINUED | OUTPATIENT
Start: 2024-02-20 | End: 2024-02-20

## 2024-02-20 RX ORDER — METOPROLOL TARTRATE 50 MG
1 TABLET ORAL
Refills: 0 | DISCHARGE

## 2024-02-20 RX ORDER — SENNA PLUS 8.6 MG/1
2 TABLET ORAL
Qty: 28 | Refills: 0
Start: 2024-02-20 | End: 2024-03-04

## 2024-02-20 RX ORDER — FENTANYL CITRATE 50 UG/ML
50 INJECTION INTRAVENOUS
Refills: 0 | Status: DISCONTINUED | OUTPATIENT
Start: 2024-02-20 | End: 2024-02-20

## 2024-02-20 RX ADMIN — Medication 10 MILLIGRAM(S): at 15:36

## 2024-02-20 RX ADMIN — Medication 2000 MILLIGRAM(S): at 21:56

## 2024-02-20 RX ADMIN — Medication 10 MILLIGRAM(S): at 15:00

## 2024-02-20 RX ADMIN — CELECOXIB 200 MILLIGRAM(S): 200 CAPSULE ORAL at 21:57

## 2024-02-20 RX ADMIN — Medication 1000 MILLIGRAM(S): at 21:56

## 2024-02-20 RX ADMIN — SENNA PLUS 2 TABLET(S): 8.6 TABLET ORAL at 21:56

## 2024-02-20 RX ADMIN — PANTOPRAZOLE SODIUM 40 MILLIGRAM(S): 20 TABLET, DELAYED RELEASE ORAL at 10:04

## 2024-02-20 RX ADMIN — HYDROMORPHONE HYDROCHLORIDE 0.5 MILLIGRAM(S): 2 INJECTION INTRAMUSCULAR; INTRAVENOUS; SUBCUTANEOUS at 21:58

## 2024-02-20 RX ADMIN — HYDROMORPHONE HYDROCHLORIDE 0.5 MILLIGRAM(S): 2 INJECTION INTRAMUSCULAR; INTRAVENOUS; SUBCUTANEOUS at 22:58

## 2024-02-20 RX ADMIN — Medication 500 MILLIGRAM(S): at 21:56

## 2024-02-20 RX ADMIN — POLYETHYLENE GLYCOL 3350 17 GRAM(S): 17 POWDER, FOR SOLUTION ORAL at 21:57

## 2024-02-20 NOTE — PHYSICAL THERAPY INITIAL EVALUATION ADULT - GAIT TRAINING, PT EVAL
Patient will be able to safely ambulate 50 feet with Rolling Walker by discharge from acute care setting.

## 2024-02-20 NOTE — DISCHARGE NOTE PROVIDER - NSDCFUADDINST_GEN_ALL_CORE_FT
Discharge Instructions for Right Total Knee Arthroplasty:    1. ACTIVITY: WBAT, Rolling walker, Daily PT. Gentle ROM 0-full as tolerated. Walk plenty.  Keep a bump (rolled towel or blanket) under your heel to keep leg straight while in bed or chair for 2 weeks.  2. CALL WITH: fever over 101, wound redness, drainage or open area, calf pain/calf swelling  3. BANDAGE: On POD7 (2/27/24) Home PT to place a new Mepilex Ag bandage over incision.  Be careful not to removed mesh that is glued to the wound. There are no sutures or staples to remove. May change sooner ONLY if leaks or falls off. DO NOT REMOVE BANDAGE TO CHECK WOUND ON INTAKE. Dressing to be removed by Home PT on POD14 (3/5/24) and left open to air  as long as the incision is dry; if there is continued drainage place a new dressing and instruct patient to call office and arrange follow up in the office on the next clinic day.  4. SHOWER: Remove outer ACE wrap and throw it away. Shower OK. No Tub baths.  5. STAPLES: There are NO Staples to remove. Sutures are Dissolvable.  6. DVT PE Prophylaxis: Continue home Eliquis 5mg PO daily and Aspirin 81mg PO daily. See Med Rec.  7. GI: Continue Protonix daily while on Anticoagulant. eRx has been sent to your pharmacy.  8. FOLLOW UP: Dr. Elizondo in 21 days. Call office to schedule.   9. MEDICATIONS: If going home, eRX sent to your pharmacy for .   10. **Call office if medications not covered under your insurance , especially BLOOD CLOT PREVENTION/anticoagulant medication.  Discharge Instructions for Right Total Knee Arthroplasty:    1. ACTIVITY: WBAT, Rolling walker, Daily PT. Gentle ROM 0-full as tolerated. Walk plenty.  Keep a bump (rolled towel or blanket) under your heel to keep leg straight while in bed or chair for 2 weeks.  2. CALL WITH: fever over 101, wound redness, drainage or open area, calf pain/calf swelling  3. BANDAGE: On POD7 (2/27/24) Home PT to place a new Mepilex Ag bandage over incision.  Be careful not to removed mesh that is glued to the wound. There are no sutures or staples to remove. May change sooner ONLY if leaks or falls off. DO NOT REMOVE BANDAGE TO CHECK WOUND ON INTAKE. Dressing to be removed by Home PT on POD14 (3/5/24) and left open to air  as long as the incision is dry; if there is continued drainage place a new dressing and instruct patient to call office and arrange follow up in the office on the next clinic day.  4. SHOWER: Remove outer ACE wrap and throw it away. Shower OK. No Tub baths.  5. STAPLES: There are NO Staples to remove. Sutures are Dissolvable.  6. ANTIBIOTICS: Continue PO Duricef twice daily for 7 days upon discharge. eRx sent to the Pharmacy.   7. DVT PE Prophylaxis: Continue home Eliquis 5mg PO daily and Aspirin 81mg PO daily. See Med Rec.  8. GI: Continue Protonix daily while on Anticoagulant. eRx has been sent to your pharmacy.  9. FOLLOW UP: Dr. Elizondo in 21 days. Call office to schedule.   10. MEDICATIONS: If going home, eRX sent to your pharmacy for .   11. **Call office if medications not covered under your insurance , especially BLOOD CLOT PREVENTION/anticoagulant medication.  Discharge Instructions for Right Total Knee Arthroplasty:    1. ACTIVITY: Protected WBAT for transfers with assistive device, Daily PT. Gentle ROM 0-full as tolerated.  Knee Immobilizer at night while sleeping for 2 weeks. Keep a bump (rolled towel or blanket) under your heel to keep leg straight while in bed or chair for 2 weeks.  2. CALL WITH: fever over 101, wound redness, drainage or open area, calf pain/calf swelling  3. BANDAGE: On POD7 (2/27/24) Home PT to place a new Mepilex Ag bandage over incision.  Be careful not to removed mesh that is glued to the wound. There are no sutures or staples to remove. May change sooner ONLY if leaks or falls off. DO NOT REMOVE BANDAGE TO CHECK WOUND ON INTAKE. Dressing to be removed by Home PT on POD14 (3/5/24) and left open to air  as long as the incision is dry; if there is continued drainage place a new dressing and instruct patient to call office and arrange follow up in the office on the next clinic day.  4. SHOWER: Remove outer ACE wrap and throw it away. Shower OK. No Tub baths.  5. STAPLES: There are NO Staples to remove. Sutures are Dissolvable.  6. ANTIBIOTICS: Continue PO Duricef twice daily for 7 days upon discharge. eRx sent to the Pharmacy.   7. DVT PE Prophylaxis: Continue home Eliquis 5mg PO twice daily and Aspirin 81mg PO daily. See Med Rec.  8. GI: Continue Protonix daily while on Anticoagulant. eRx has been sent to your pharmacy.  9. FOLLOW UP: Dr. Elizondo in 2 weeks. Call office to schedule.   10. MEDICATIONS: If going home, eRX sent to your pharmacy for .   11. **Call office if medications not covered under your insurance, especially BLOOD CLOT PREVENTION/anticoagulant medication.  Discharge Instructions for Right Total Knee Arthroplasty:    1. ACTIVITY: Protected WBAT for transfers with assistive device, Daily PT. Gentle ROM 0-full as tolerated.  Knee Immobilizer at night while sleeping for 2 weeks. Keep a bump (rolled towel or blanket) under your heel to keep leg straight while in bed or chair for 2 weeks.  2. CALL WITH: fever over 101, wound redness, drainage or open area, calf pain/calf swelling  3. BANDAGE: Home PT to remove MARYCARMEN NO SOONER than POD7 (2/27/24) and place a Mepilex Ag bandage over incision. May change sooner ONLY if MARYCARMEN leaks or falls off. DO NOT REMOVE BANDAGE TO CHECK WOUND ON INTAKE. Dressing to be removed by Home PT on POD14 (3/5/24) and left open to air as long as the incision is dry; if there is continued drainage place a new dressing and instruct patient to call office and arrange follow up in the office on the next clinic day.  4. STAPLES: There are NO Staples to remove. Sutures are Dissolvable.  5. SHOWER: Okay to shower so long as battery pack is kept dry.  6. ANTIBIOTICS: Continue PO Duricef twice daily for 7 days upon discharge. eRx sent to the Pharmacy.   7. DVT PE Prophylaxis: Continue home Eliquis 5mg PO twice daily and Aspirin 81mg PO daily. See Med Rec.  8. GI: Continue Protonix daily while on Anticoagulant. eRx has been sent to your pharmacy.  9. FOLLOW UP: Dr. Elizondo in 2 weeks. Call office to schedule.   10. MEDICATIONS: If going home, eRX sent to your pharmacy for .   11. **Call office if medications not covered under your insurance, especially BLOOD CLOT PREVENTION/anticoagulant medication.

## 2024-02-20 NOTE — PATIENT PROFILE ADULT - FUNCTIONAL ASSESSMENT - BASIC MOBILITY 6.
2-calculated by average/Not able to assess (calculate score using [facility] averaging method)

## 2024-02-20 NOTE — DISCHARGE NOTE PROVIDER - NSDCMRMEDTOKEN_GEN_ALL_CORE_FT
aspirin 81 mg oral tablet: 1 tab(s) orally once a day  Eliquis 5 mg oral tablet: 1 tab(s) orally once a day  ergocalciferol 50 mcg (2000 intl units) oral capsule: 1 cap(s) orally once a day  lactulose 10 g/15 mL oral syrup: 15 milliliter(s) orally once a day  metoprolol succinate 25 mg oral capsule, extended release: 1 cap(s) orally once a day  Omega-3 Fish Oil 1000 mg oral capsule: 1 cap(s) orally once a day  polyethylene glycol 3350 oral powder for reconstitution: 17 gram(s) orally once a day  sertraline 25 mg oral tablet: 1 tab(s) orally once a day  Tylenol 325 mg oral tablet: 2 tab(s) orally 2 times a day   aspirin 81 mg oral tablet: 1 tab(s) orally once a day  Eliquis 5 mg oral tablet: 1 tab(s) orally once a day  ergocalciferol 50 mcg (2000 intl units) oral capsule: 1 cap(s) orally once a day  lactulose 10 g/15 mL oral syrup: 15 milliliter(s) orally once a day  metoprolol succinate 25 mg oral capsule, extended release: 1 cap(s) orally once a day  Omega-3 Fish Oil 1000 mg oral capsule: 1 cap(s) orally once a day  oxyCODONE 5 mg oral tablet: 1 tab(s) orally every 4 hours as needed for  severe pain MDD: 6  polyethylene glycol 3350 oral powder for reconstitution: 17 gram(s) orally once a day  Protonix 40 mg oral delayed release tablet: 1 tab(s) orally once a day  Senna 8.6 mg oral tablet: 2 tab(s) orally once a day (at bedtime) as needed for  constipation  sertraline 25 mg oral tablet: 1 tab(s) orally once a day  Tylenol 325 mg oral tablet: 2 tab(s) orally 2 times a day  Tylenol Extra Strength 500 mg oral tablet: 2 tab(s) orally every 8 hours   aspirin 81 mg oral tablet: 1 tab(s) orally once a day  Eliquis 5 mg oral tablet: 1 tab(s) orally 2 times a day  ergocalciferol 50 mcg (2000 intl units) oral capsule: 1 cap(s) orally once a day  lactulose 10 g/15 mL oral syrup: 15 milliliter(s) orally once a day  metoprolol succinate 25 mg oral capsule, extended release: 1 cap(s) orally once a day  Omega-3 Fish Oil 1000 mg oral capsule: 1 cap(s) orally once a day  oxyCODONE 5 mg oral tablet: 1 tab(s) orally every 4 hours as needed for  severe pain MDD: 6  polyethylene glycol 3350 oral powder for reconstitution: 17 gram(s) orally once a day  Protonix 40 mg oral delayed release tablet: 1 tab(s) orally once a day  Senna 8.6 mg oral tablet: 2 tab(s) orally once a day (at bedtime) as needed for  constipation  sertraline 25 mg oral tablet: 1 tab(s) orally once a day  Tylenol 325 mg oral tablet: 2 tab(s) orally 2 times a day  Tylenol Extra Strength 500 mg oral tablet: 2 tab(s) orally every 8 hours   aspirin 81 mg oral tablet: 1 tab(s) orally once a day  cefadroxil 500 mg oral capsule: 1 cap(s) orally 2 times a day MDD: 2  Eliquis 5 mg oral tablet: 1 tab(s) orally 2 times a day  ergocalciferol 50 mcg (2000 intl units) oral capsule: 1 cap(s) orally once a day  lactulose 10 g/15 mL oral syrup: 15 milliliter(s) orally once a day  metoprolol succinate 25 mg oral capsule, extended release: 1 cap(s) orally once a day  oxyCODONE 5 mg oral tablet: 1 tab(s) orally every 4 hours as needed for  severe pain MDD: 6  polyethylene glycol 3350 oral powder for reconstitution: 17 gram(s) orally once a day  Protonix 40 mg oral delayed release tablet: 1 tab(s) orally once a day  Senna 8.6 mg oral tablet: 2 tab(s) orally once a day (at bedtime) as needed for  constipation  sertraline 25 mg oral tablet: 1 tab(s) orally once a day  Tylenol Extra Strength 500 mg oral tablet: 2 tab(s) orally every 8 hours   aspirin 81 mg oral tablet: 1 tab(s) orally once a day  cefadroxil 500 mg oral capsule: 1 cap(s) orally 2 times a day MDD: 2  CeleBREX 200 mg oral capsule: 1 cap(s) orally 2 times a day  Eliquis 5 mg oral tablet: 1 tab(s) orally 2 times a day  ergocalciferol 50 mcg (2000 intl units) oral capsule: 1 cap(s) orally once a day  lactulose 10 g/15 mL oral syrup: 15 milliliter(s) orally once a day  metoprolol succinate 25 mg oral capsule, extended release: 1 cap(s) orally once a day  oxyCODONE 5 mg oral tablet: 1 tab(s) orally every 4 hours as needed for  severe pain MDD: 6  polyethylene glycol 3350 oral powder for reconstitution: 17 gram(s) orally once a day  Protonix 40 mg oral delayed release tablet: 1 tab(s) orally once a day  Senna 8.6 mg oral tablet: 2 tab(s) orally once a day (at bedtime) as needed for  constipation  sertraline 25 mg oral tablet: 1 tab(s) orally once a day  Tylenol Extra Strength 500 mg oral tablet: 2 tab(s) orally every 8 hours   aspirin 81 mg oral tablet: 1 tab(s) orally once a day  cefadroxil 500 mg oral capsule: 1 cap(s) orally 2 times a day MDD: 2  CeleBREX 200 mg oral capsule: 1 cap(s) orally 2 times a day  Eliquis 5 mg oral tablet: 1 tab(s) orally 2 times a day  ergocalciferol 50 mcg (2000 intl units) oral capsule: 1 cap(s) orally once a day  lactulose 10 g/15 mL oral syrup: 15 milliliter(s) orally once a day  metoprolol succinate 25 mg oral capsule, extended release: 1 cap(s) orally once a day  ondansetron 4 mg oral tablet: 1 tab(s) orally every 8 hours as needed for  nausea  oxyCODONE 5 mg oral tablet: 1 tab(s) orally every 4 hours as needed for  severe pain MDD: 6  polyethylene glycol 3350 oral powder for reconstitution: 17 gram(s) orally once a day  Protonix 40 mg oral delayed release tablet: 1 tab(s) orally once a day  Senna 8.6 mg oral tablet: 2 tab(s) orally once a day (at bedtime) as needed for  constipation  sertraline 25 mg oral tablet: 1 tab(s) orally once a day  Tylenol Extra Strength 500 mg oral tablet: 2 tab(s) orally every 8 hours

## 2024-02-20 NOTE — PATIENT PROFILE ADULT - FALL HARM RISK - HARM RISK INTERVENTIONS

## 2024-02-20 NOTE — PHYSICAL THERAPY INITIAL EVALUATION ADULT - PREDICTED DURATION OF THERAPY (DAYS/WKS), PT EVAL
Artur Sims! Looking for your thoughts.  Progressive renal failure currently on Lantus 26 units and Novolog which he uses about 2-4 units if sugars \"high\" or high carb meal.  He is experiencing difficult to control readings when gets high, and then will be in the 60's.  Discussed likely due to worsening renal failure.  Any safe suggestions on adjustment to his insulin?  Thanks.  
1 week

## 2024-02-20 NOTE — PHYSICAL THERAPY INITIAL EVALUATION ADULT - GENERAL OBSERVATIONS, REHAB EVAL
Pt found supine in bed in PACU in NAD, +R knee immobilization brace, +tele monitoring, +O2, +B SCDs, +IV, agreeable to participate in PT Evaluation. Pt requires Nicolas for bed mobility. Pt requires ModA for sit<>stand transfer to RW. Pt is able to take 2 side steps at EOB with RW and ModA. Pt returned to bed with phone in reach, ELADIO Montague is aware.

## 2024-02-20 NOTE — PROGRESS NOTE ADULT - SUBJECTIVE AND OBJECTIVE BOX
Orthopedics Post-op Check:    This is a 77y/o Female s/p Right Total Knee Arthroplasty POD 0.  Pain is controlled. Pt feeling well. No nausea or vomiting.      Labs:                        12.5   7.36  )-----------( 176      ( 20 Feb 2024 17:03 )             37.7     02-20    136  |  104  |  28<H>  ----------------------------<  145<H>  4.1   |  27  |  1.08    Ca    9.4      20 Feb 2024 17:03          Vital Signs Last 24 Hrs  T(C): 36.2 (02-20-24 @ 18:20), Max: 36.6 (02-20-24 @ 14:12)  T(F): 97.1 (02-20-24 @ 18:20), Max: 97.8 (02-20-24 @ 14:12)  HR: 92 (02-20-24 @ 18:20) (42 - 92)  BP: 120/81 (02-20-24 @ 18:20) (108/84 - 174/113)  BP(mean): 91 (02-20-24 @ 18:20) (91 - 91)  RR: 17 (02-20-24 @ 18:20) (12 - 25)  SpO2: 100% (02-20-24 @ 18:20) (98% - 100%)    Physical Exam:  General:  NAD AAOx3.  Musculoskeletal:  Right Knee:  Dressing clean, dry and intact.  SCDs in place.  Calves are soft and nontender.  Moving all toes and ankle, +EHL/FHL/TA/GS.  SILT throughout.  DP and PT pulses 2+.    A/P:  Stable POD 0 Right Total Knee Arthroplasty  -Analgesia  -RLE elevation  -Ice application  -Prophylactic antibiotics  -DVT PE prophylaxis  -SCDs  -Incentive spirometry  -OOB PT Protected WBAT RLE for transfers  -Knee immobilizer while sleeping at night       Orthopedics Post-op Check:    This is a 79y/o Female s/p Right Total Knee Arthroplasty POD 0.  Pain is controlled. Pt feeling well. No nausea or vomiting.      Labs:                        12.5   7.36  )-----------( 176      ( 20 Feb 2024 17:03 )             37.7     02-20    136  |  104  |  28<H>  ----------------------------<  145<H>  4.1   |  27  |  1.08    Ca    9.4      20 Feb 2024 17:03          Vital Signs Last 24 Hrs  T(C): 36.2 (02-20-24 @ 18:20), Max: 36.6 (02-20-24 @ 14:12)  T(F): 97.1 (02-20-24 @ 18:20), Max: 97.8 (02-20-24 @ 14:12)  HR: 92 (02-20-24 @ 18:20) (42 - 92)  BP: 120/81 (02-20-24 @ 18:20) (108/84 - 174/113)  BP(mean): 91 (02-20-24 @ 18:20) (91 - 91)  RR: 17 (02-20-24 @ 18:20) (12 - 25)  SpO2: 100% (02-20-24 @ 18:20) (98% - 100%)    Physical Exam:  General:  NAD AAOx3.  Musculoskeletal:  Right Knee:  Dressing clean, dry and intact.  SCDs in place.  Calves are soft and nontender.  Moving all toes and ankle, +EHL/FHL/TA/GS.  SILT throughout.  DP and PT pulses faint, dopplerable.    A/P:  Stable POD 0 Right Total Knee Arthroplasty  -Analgesia  -RLE elevation  -Ice application  -Prophylactic antibiotics  -DVT PE prophylaxis  -SCDs  -Incentive spirometry  -OOB PT Protected WBAT RLE for transfers  -Knee immobilizer while sleeping at night

## 2024-02-20 NOTE — CONSULT NOTE ADULT - SUBJECTIVE AND OBJECTIVE BOX
PCP:  Dr Geni Luaren  Cardio: Dr. Reed    CHIEF COMPLAINT:     HISTORY OF THE PRESENT ILLNESS: this is a 78  female  with PMH of afib on eliquis,  HTN, HLD, freq falls, chronic pain syndrome, Cognitive communication deficit ( per chart review etiology is not documented) AAA, H/o ETOH abuse, denies any alcohol intake in a few years per H &P, now lives in a facility,  depression, OP, PVD with toe amputations and OA of her right knee, with pain on ambulation.  Per Facility where she resides, pt was getting around via wheelchair.  She is now seen in PACU s/p right TKR, awake, alert, answering questions, VSS, IN NAD, denies any CP or SOB.  We are consulted for medical management      PMH: as above  PSH: elbow surgery,  amputation of toe  FAMILY HISTORY:  Family hx of alcoholism (Father)  Family history of heart disease (Sibling)  Family history of heart attack (Child)  Social History: former smoker, 1.5 ppd x 50 years, h/O ETOH abuse, sober x 2-3 years  Allergies: NKDA    Home Medications:  aspirin 81 mg oral tablet: 1 tab(s) orally once a day (20 Feb 2024 09:38)  Eliquis 5 mg oral tablet: 1 tab(s) orally 2 times a day (20 Feb 2024 14:23)  ergocalciferol 50 mcg (2000 intl units) oral capsule: 1 cap(s) orally once a day (20 Feb 2024 09:38)  lactulose 10 g/15 mL oral syrup: 15 milliliter(s) orally once a day (20 Feb 2024 09:38)  metoprolol succinate 25 mg oral capsule, extended release: 1 cap(s) orally once a day (20 Feb 2024 09:38)  Omega-3 Fish Oil 1000 mg oral capsule: 1 cap(s) orally once a day (20 Feb 2024 09:38)  polyethylene glycol 3350 oral powder for reconstitution: 17 gram(s) orally once a day (20 Feb 2024 09:38)  sertraline 25 mg oral tablet: 1 tab(s) orally once a day (20 Feb 2024 09:38)  Tylenol 325 mg oral tablet: 2 tab(s) orally 2 times a day (20 Feb 2024 09:38)        Vital Signs Last 24 Hrs  T(C): 36.4 (20 Feb 2024 09:45), Max: 36.4 (20 Feb 2024 09:45)  T(F): 97.6 (20 Feb 2024 09:45), Max: 97.6 (20 Feb 2024 09:45)  HR: 42 (20 Feb 2024 09:45) (42 - 42)  BP: 161/97 (20 Feb 2024 10:11) (161/97 - 174/113)  BP(mean): --  RR: 15 (20 Feb 2024 09:45) (15 - 15)  SpO2: 99% (20 Feb 2024 09:45) (99% - 99%)    REVIEW OF SYSTEMS:   All 10 systems reviewed in detailed and found to be negative with the exception of what has already been described above    MEDICATIONS  (STANDING):  tranexamic acid IVPB 1000 milliGRAM(s) IV Intermittent once  tranexamic acid IVPB 1000 milliGRAM(s) IV Intermittent once    MEDICATIONS  (PRN):    PHYSICAL EXAM:    GENERAL: Comfortable, no acute distress   HEAD:  Normocephalic, atraumatic  EYES: EOMI, PERRLA  HEENT: Moist mucous membranes  NECK: Supple, No JVD  NERVOUS SYSTEM:  Alert & Oriented X3, Motor Strength 5/5 B/L upper and lower extremities  CHEST/LUNG: Clear to auscultation bilaterally  HEART: Regular rate and rhythm  ABDOMEN: Soft, non tender, Nondistended, Bowel sounds present  GENITOURINARY: Voiding, no palpable bladder  EXTREMITIES:   No clubbing, cyanosis, or edema  MUSCULOSKELETAL right knee with brace, + brittany, awaiting return of sensation  SKIN-no rash      LABS: pending      IMPRESSION: 79 yo female with above pmh a/w:  # OA right knee  # S/P right TKR    POD#0  pain control  PT eval  Bowel regimen  IVF's  Finish ABXs  DASH diet  VTE prophylaxis  monitor CBC/BMP      # HTN  cont bb  pt at high risk for fluctuations in B/P 2/2 anesthesia, pain, hypovolemia and use of narcotics, placing pt at high risk for MI/CVA  monitor B/P closely  adjust anti-htn's accordingly    # afib, paroxysmal, CV#5  resume eliquis asap when ok with ortho   cont bb    #PVD  cont asa    #depression  cont sertraline    # Vte prophylaxis  eliquis   Venodynes  INC mobility      #advanced directives: per [facility] where pt resides, pt is a DNI/ DNR, noMolst included      Thank you for the consult, will follow         PCP:  Dr Geni Lauren  Cardio: Dr. Reed    CHIEF COMPLAINT:     HISTORY OF THE PRESENT ILLNESS: this is a 78  female  with PMH of afib on eliquis,  HTN, HLD, freq falls, chronic pain syndrome, Cognitive communication deficit ( per chart review etiology is not documented) AAA, H/o ETOH abuse, denies any alcohol intake in a few years per H &P, now lives in a facility,  depression, OP, PVD with left middle toe amputations and OA of her right knee, with pain on ambulation.  Per Facility where she resides, pt was getting around via wheelchair.  She is now seen in PACU s/p right TKR, awake, alert, answering questions, VSS, IN NAD, denies any CP or SOB.  We are consulted for medical management      PMH: as above  PSH: elbow surgery,  amputation of left middle toe  FAMILY HISTORY:  Family hx of alcoholism (Father)  Family history of heart disease (Sibling)  Family history of heart attack (Child)  Social History: former smoker, 1.5 ppd x 50 years, h/O ETOH abuse, sober x 2-3 years  Allergies: NKDA    Home Medications:  aspirin 81 mg oral tablet: 1 tab(s) orally once a day (20 Feb 2024 09:38)  Eliquis 5 mg oral tablet: 1 tab(s) orally 2 times a day (20 Feb 2024 14:23)  ergocalciferol 50 mcg (2000 intl units) oral capsule: 1 cap(s) orally once a day (20 Feb 2024 09:38)  lactulose 10 g/15 mL oral syrup: 15 milliliter(s) orally once a day (20 Feb 2024 09:38)  metoprolol succinate 25 mg oral capsule, extended release: 1 cap(s) orally once a day (20 Feb 2024 09:38)  Omega-3 Fish Oil 1000 mg oral capsule: 1 cap(s) orally once a day (20 Feb 2024 09:38)  polyethylene glycol 3350 oral powder for reconstitution: 17 gram(s) orally once a day (20 Feb 2024 09:38)  sertraline 25 mg oral tablet: 1 tab(s) orally once a day (20 Feb 2024 09:38)  Tylenol 325 mg oral tablet: 2 tab(s) orally 2 times a day (20 Feb 2024 09:38)        Vital Signs Last 24 Hrs  T(C): 36.4 (20 Feb 2024 09:45), Max: 36.4 (20 Feb 2024 09:45)  T(F): 97.6 (20 Feb 2024 09:45), Max: 97.6 (20 Feb 2024 09:45)  HR: 42 (20 Feb 2024 09:45) (42 - 42)  BP: 161/97 (20 Feb 2024 10:11) (161/97 - 174/113)  BP(mean): --  RR: 15 (20 Feb 2024 09:45) (15 - 15)  SpO2: 99% (20 Feb 2024 09:45) (99% - 99%)    REVIEW OF SYSTEMS:   All 10 systems reviewed in detailed and found to be negative with the exception of what has already been described above    MEDICATIONS  (STANDING):  tranexamic acid IVPB 1000 milliGRAM(s) IV Intermittent once  tranexamic acid IVPB 1000 milliGRAM(s) IV Intermittent once    MEDICATIONS  (PRN):    PHYSICAL EXAM:    GENERAL: Comfortable, no acute distress   HEAD:  Normocephalic, atraumatic  EYES: EOMI, PERRLA  HEENT: Moist mucous membranes  NECK: Supple, No JVD  NERVOUS SYSTEM:  Alert & Oriented X3, Motor Strength 5/5 B/L upper and lower extremities  CHEST/LUNG: Clear to auscultation bilaterally  HEART: Regular rate and rhythm  ABDOMEN: Soft, non tender, Nondistended, Bowel sounds present  GENITOURINARY: Voiding, no palpable bladder  EXTREMITIES:   No clubbing, cyanosis, or edema  MUSCULOSKELETAL right knee with brace, + brittany, awaiting return of sensation  SKIN-no rash      LABS: pending      IMPRESSION: 77 yo female with above pmh a/w:  # OA right knee  # S/P right TKR    POD#0  pain control  PT eval  Bowel regimen  IVF's  Finish ABXs  DASH diet  VTE prophylaxis  monitor CBC/BMP      # HTN  cont bb  pt at high risk for fluctuations in B/P 2/2 anesthesia, pain, hypovolemia and use of narcotics, placing pt at high risk for MI/CVA  monitor B/P closely  adjust anti-htn's accordingly    # afib, paroxysmal, CV#5  resume eliquis asap when ok with ortho   cont bb    #PVD  cont asa    #depression  cont sertraline    # Vte prophylaxis  eliquis   Venodynes  INC mobility      #advanced directives: per [facility] where pt resides, pt is a DNI/ DNR, noMolst included      Thank you for the consult, will follow         PCP:  Dr Geni Lauren  Cardio: Dr. Reed    CHIEF COMPLAINT:     HISTORY OF THE PRESENT ILLNESS: this is a 78  female  with PMH of afib on eliquis,  HTN, HLD, freq falls, chronic pain syndrome, Cognitive communication deficit ( per chart review etiology is not documented) AAA, H/o ETOH abuse, denies any alcohol intake in a few years per H &P, now lives in a facility,  depression, OP, PVD with left middle toe amputations and OA of her right knee, with pain on ambulation.  Per Facility where she resides, pt was getting around via wheelchair.  She is now seen in PACU s/p right TKR, awake, alert, answering questions, VSS, IN NAD, denies any CP or SOB.  We are consulted for medical management      PMH: as above  PSH: elbow surgery,  amputation of left middle toe  FAMILY HISTORY:  Family hx of alcoholism (Father)  Family history of heart disease (Sibling)  Family history of heart attack (Child)  Social History: former smoker, 1.5 ppd x 50 years, h/O ETOH abuse, sober x 2-3 years  Allergies: NKDA    Home Medications:  aspirin 81 mg oral tablet: 1 tab(s) orally once a day (20 Feb 2024 09:38)  Eliquis 5 mg oral tablet: 1 tab(s) orally 2 times a day (20 Feb 2024 14:23)  ergocalciferol 50 mcg (2000 intl units) oral capsule: 1 cap(s) orally once a day (20 Feb 2024 09:38)  lactulose 10 g/15 mL oral syrup: 15 milliliter(s) orally once a day (20 Feb 2024 09:38)  metoprolol succinate 25 mg oral capsule, extended release: 1 cap(s) orally once a day (20 Feb 2024 09:38)  Omega-3 Fish Oil 1000 mg oral capsule: 1 cap(s) orally once a day (20 Feb 2024 09:38)  polyethylene glycol 3350 oral powder for reconstitution: 17 gram(s) orally once a day (20 Feb 2024 09:38)  sertraline 25 mg oral tablet: 1 tab(s) orally once a day (20 Feb 2024 09:38)  Tylenol 325 mg oral tablet: 2 tab(s) orally 2 times a day (20 Feb 2024 09:38)        Vital Signs Last 24 Hrs  T(C): 36.4 (20 Feb 2024 09:45), Max: 36.4 (20 Feb 2024 09:45)  T(F): 97.6 (20 Feb 2024 09:45), Max: 97.6 (20 Feb 2024 09:45)  HR: 42 (20 Feb 2024 09:45) (42 - 42)  BP: 161/97 (20 Feb 2024 10:11) (161/97 - 174/113)  BP(mean): --  RR: 15 (20 Feb 2024 09:45) (15 - 15)  SpO2: 99% (20 Feb 2024 09:45) (99% - 99%)    REVIEW OF SYSTEMS:   All 10 systems reviewed in detailed and found to be negative with the exception of what has already been described above    MEDICATIONS  (STANDING):  tranexamic acid IVPB 1000 milliGRAM(s) IV Intermittent once  tranexamic acid IVPB 1000 milliGRAM(s) IV Intermittent once    MEDICATIONS  (PRN):    PHYSICAL EXAM:    GENERAL: Comfortable, no acute distress   HEAD:  Normocephalic, atraumatic  EYES: EOMI, PERRLA  HEENT: Moist mucous membranes  NECK: Supple, No JVD  NERVOUS SYSTEM:  Alert & Oriented X3, Motor Strength 5/5 B/L upper and lower extremities  CHEST/LUNG: Clear to auscultation bilaterally  HEART: Regular rate and rhythm  ABDOMEN: Soft, non tender, Nondistended, Bowel sounds present  GENITOURINARY: Voiding, no palpable bladder  EXTREMITIES:   No clubbing, cyanosis, or edema  MUSCULOSKELETAL right knee with brace, + brittany, awaiting return of sensation  SKIN-no rash      LABS: pending      IMPRESSION: 79 yo female with above pmh a/w:  # OA right knee  # S/P right TKR    POD#0  pain control  PT eval  Bowel regimen  IVF's  Finish ABXs  DASH diet  VTE prophylaxis  monitor CBC/BMP      # HTN  cont bb  pt at high risk for fluctuations in B/P 2/2 anesthesia, pain, hypovolemia and use of narcotics, placing pt at high risk for MI/CVA  monitor B/P closely  adjust anti-htn's accordingly    # afib, chronic CV#5  resume eliquis asap when ok with ortho   cont bb    #PVD  cont asa    #depression  cont sertraline    # Vte prophylaxis  eliquis   Venodynes  INC mobility      #advanced directives: per [facility] where pt resides, pt is a DNI/ DNR, noMolst included      Thank you for the consult, will follow

## 2024-02-20 NOTE — DISCHARGE NOTE PROVIDER - HOSPITAL COURSE
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 Pt is afebrile with stable vital signs. Pain is controlled. Alert and Oriented. Exam reveals intact EHL FHL TA GS, +DP. Dressing is clean and dry.    Hospital Course:  Patient presented to [facility] medically cleared for elective Right Total Knee Replacement Surgery, having failed outpatient conservative management. Prophylactic antibiotics were started before the procedure and continued for 24 hours. They were admitted after surgery to the orthopedic floor. There were no complications during the hospital stay.     Routine consults were obtained from Physical Therapy for twice daily PT and from the Hospitalist for Medical Co-management. Patient was placed on anticoagulation. Pertinent home medications were continued. Daily labs were followed.      On POD 0 pt was stable overnight. No major events post-op. Pt received twice daily PT. The plan is for DC to home with home PT** or to Rehab for ongoing PT**. The orthopedic Attending is aware and agrees.      **POD1 DC DOCUMENTATION** (Keep or Delete depending on scenario)  The patient had no post-operative complications and clinically progressed faster than anticipated. The following condition(s) were actively treated during the hospital stay:  A-Fib  HTN  PVD  Depression  The patient met criteria for discharge before the 2nd night of the stay. The patient was appropriately and safely discharged home with home PT.    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 Pt is afebrile with stable vital signs. Pain is controlled. Alert and Oriented. Exam reveals intact EHL FHL TA GS, +DP. Dressing is clean and dry.    Hospital Course:  Patient presented to [facility] medically cleared for elective Right Total Knee Replacement Surgery, having failed outpatient conservative management. Prophylactic antibiotics were started before the procedure and continued for 24 hours. They were admitted after surgery to the orthopedic floor. There were no complications during the hospital stay.     Routine consults were obtained from Physical Therapy for twice daily PT and from the Hospitalist for Medical Co-management. Patient was placed on anticoagulation. Pertinent home medications were continued. Daily labs were followed.      On POD 0 pt was stable overnight. No major events post-op. Pt received twice daily PT. The plan is for DC to home with home PT** or to Rehab for ongoing PT**. The orthopedic Attending is aware and agrees.      **POD1 DC DOCUMENTATION** (Keep or Delete depending on scenario)  The patient had no post-operative complications and clinically progressed faster than anticipated. The following condition(s) were actively treated during the hospital stay:  A-Fib  HTN  PVD  Depression  The patient met criteria for discharge before the 2nd night of the stay. The patient was appropriately and safely discharged home with home PT.    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 Pt is afebrile with stable vital signs. Pain is controlled. Alert and Oriented. Exam reveals intact EHL FHL TA GS, +DP. Dressing is clean and dry.    Hospital Course:  Patient presented to [facility] medically cleared for elective Right Total Knee Replacement Surgery, having failed outpatient conservative management. Prophylactic IV antibiotics were started before the procedure and continued for 24 hours post-op, then transitioned to PO antibiotics for remainder of hospital stay and continued for 7 days after discharge. They were admitted after surgery to the orthopedic floor. There were no complications during the hospital stay.     Routine consults were obtained from Physical Therapy for twice daily PT and from the Hospitalist for Medical Co-management. Patient was placed on anticoagulation. Pertinent home medications were continued. Daily labs were followed.      On POD 0 pt was stable overnight. No major events post-op. Pt received twice daily PT. The plan is for DC to home with home PT** or to Rehab for ongoing PT**. The orthopedic Attending is aware and agrees.      **POD1 DC DOCUMENTATION** (Keep or Delete depending on scenario)  The patient had no post-operative complications and clinically progressed faster than anticipated. The following condition(s) were actively treated during the hospital stay:  A-Fib  HTN  PVD  Depression  The patient met criteria for discharge before the 2nd night of the stay. The patient was appropriately and safely discharged home with home PT.    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 Pt is afebrile with stable vital signs. Pain is controlled. Alert and Oriented. Exam reveals intact EHL FHL TA GS, +DP. Dressing is clean and dry.    Hospital Course:  Patient presented to [facility] medically cleared for elective Right Total Knee Replacement Surgery, having failed outpatient conservative management. Prophylactic IV antibiotics were started before the procedure and continued for 24 hours post-op, then transitioned to PO antibiotics for remainder of hospital stay and continued for 7 days after discharge. They were admitted after surgery to the orthopedic floor. There were no complications during the hospital stay.     Routine consults were obtained from Physical Therapy for twice daily PT and from the Hospitalist for Medical Co-management. Patient was placed on anticoagulation. Pertinent home medications were continued. Daily labs were followed.      On POD 0 pt was stable overnight. No major events post-op. Pt received twice daily PT. The plan is for DC to Rehab for ongoing PT. The orthopedic Attending is aware and agrees.

## 2024-02-20 NOTE — DISCHARGE NOTE PROVIDER - CARE PROVIDER_API CALL
Minesh Elizondo  Orthopaedic Surgery  [address] [place] [id number]  Phone: [phone]  Fax: [phone]  Follow Up Time:

## 2024-02-20 NOTE — DISCHARGE NOTE PROVIDER - NSDCFUSCHEDAPPT_GEN_ALL_CORE_FT
[facility] Physician [facility]  [address]  Scheduled Appointment: 03/19/2024    Agustín Sierra  [facility]  [address]  Scheduled Appointment: 03/19/2024

## 2024-02-20 NOTE — PHYSICAL THERAPY INITIAL EVALUATION ADULT - PERTINENT HX OF CURRENT PROBLEM, REHAB EVAL
78 year old  female  with PMH of afib on eliquis,  HTN, HLD, freq falls, chronic pain syndrome, Cognitive communication deficit ( per chart review etiology is not documented) AAA, H/o ETOH abuse, denies any alcohol intake in a few years per H &P, now lives in a facility,  depression, OP, PVD with left middle toe amputations and OA of her right knee, with pain on ambulation.  Per Facility where she resides, pt was getting around via wheelchair.  She is now seen in PACU s/p right TKR, awake, alert, answering questions, VSS, IN NAD, denies any CP or SOB.  We are consulted for medical management

## 2024-02-20 NOTE — PHYSICAL THERAPY INITIAL EVALUATION ADULT - ADDITIONAL COMMENTS
Patient has been using a Wheelchair for the past year secondary to knee pain Patient has been using a Wheelchair for the past year secondary to knee pain  Patient uses UE strength to slide from bed<>wheelchair<>toilet, does not stand.   Does not have a RW

## 2024-02-21 LAB
ANION GAP SERPL CALC-SCNC: 3 MMOL/L — LOW (ref 5–17)
BUN SERPL-MCNC: 32 MG/DL — HIGH (ref 7–23)
CALCIUM SERPL-MCNC: 8.7 MG/DL — SIGNIFICANT CHANGE UP (ref 8.5–10.1)
CHLORIDE SERPL-SCNC: 105 MMOL/L — SIGNIFICANT CHANGE UP (ref 96–108)
CO2 SERPL-SCNC: 28 MMOL/L — SIGNIFICANT CHANGE UP (ref 22–31)
CREAT SERPL-MCNC: 1.16 MG/DL — SIGNIFICANT CHANGE UP (ref 0.5–1.3)
EGFR: 49 ML/MIN/1.73M2 — LOW
GLUCOSE BLDC GLUCOMTR-MCNC: 115 MG/DL — HIGH (ref 70–99)
GLUCOSE SERPL-MCNC: 143 MG/DL — HIGH (ref 70–99)
HCT VFR BLD CALC: 30.2 % — LOW (ref 34.5–45)
HGB BLD-MCNC: 10.1 G/DL — LOW (ref 11.5–15.5)
MCHC RBC-ENTMCNC: 30.6 PG — SIGNIFICANT CHANGE UP (ref 27–34)
MCHC RBC-ENTMCNC: 33.4 GM/DL — SIGNIFICANT CHANGE UP (ref 32–36)
MCV RBC AUTO: 91.5 FL — SIGNIFICANT CHANGE UP (ref 80–100)
PLATELET # BLD AUTO: 142 K/UL — LOW (ref 150–400)
POTASSIUM SERPL-MCNC: 4.3 MMOL/L — SIGNIFICANT CHANGE UP (ref 3.5–5.3)
POTASSIUM SERPL-SCNC: 4.3 MMOL/L — SIGNIFICANT CHANGE UP (ref 3.5–5.3)
RBC # BLD: 3.3 M/UL — LOW (ref 3.8–5.2)
RBC # FLD: 12.9 % — SIGNIFICANT CHANGE UP (ref 10.3–14.5)
SODIUM SERPL-SCNC: 136 MMOL/L — SIGNIFICANT CHANGE UP (ref 135–145)
WBC # BLD: 9.62 K/UL — SIGNIFICANT CHANGE UP (ref 3.8–10.5)
WBC # FLD AUTO: 9.62 K/UL — SIGNIFICANT CHANGE UP (ref 3.8–10.5)

## 2024-02-21 PROCEDURE — 99232 SBSQ HOSP IP/OBS MODERATE 35: CPT

## 2024-02-21 RX ORDER — APIXABAN 2.5 MG/1
5 TABLET, FILM COATED ORAL EVERY 12 HOURS
Refills: 0 | Status: DISCONTINUED | OUTPATIENT
Start: 2024-02-21 | End: 2024-02-23

## 2024-02-21 RX ADMIN — APIXABAN 5 MILLIGRAM(S): 2.5 TABLET, FILM COATED ORAL at 09:55

## 2024-02-21 RX ADMIN — CELECOXIB 200 MILLIGRAM(S): 200 CAPSULE ORAL at 22:50

## 2024-02-21 RX ADMIN — Medication 250 MILLIGRAM(S): at 01:08

## 2024-02-21 RX ADMIN — PANTOPRAZOLE SODIUM 40 MILLIGRAM(S): 20 TABLET, DELAYED RELEASE ORAL at 09:55

## 2024-02-21 RX ADMIN — Medication 250 MILLIGRAM(S): at 13:09

## 2024-02-21 RX ADMIN — Medication 1000 MILLIGRAM(S): at 22:49

## 2024-02-21 RX ADMIN — Medication 1 TABLET(S): at 09:54

## 2024-02-21 RX ADMIN — Medication 101.6 MILLIGRAM(S): at 05:35

## 2024-02-21 RX ADMIN — APIXABAN 5 MILLIGRAM(S): 2.5 TABLET, FILM COATED ORAL at 22:50

## 2024-02-21 RX ADMIN — Medication 2000 MILLIGRAM(S): at 05:35

## 2024-02-21 RX ADMIN — Medication 500 MILLIGRAM(S): at 09:55

## 2024-02-21 RX ADMIN — Medication 1 MILLIGRAM(S): at 09:54

## 2024-02-21 RX ADMIN — OXYCODONE HYDROCHLORIDE 10 MILLIGRAM(S): 5 TABLET ORAL at 20:43

## 2024-02-21 RX ADMIN — Medication 250 MILLIGRAM(S): at 06:45

## 2024-02-21 RX ADMIN — CELECOXIB 200 MILLIGRAM(S): 200 CAPSULE ORAL at 09:55

## 2024-02-21 RX ADMIN — Medication 1000 MILLIGRAM(S): at 13:31

## 2024-02-21 RX ADMIN — OXYCODONE HYDROCHLORIDE 10 MILLIGRAM(S): 5 TABLET ORAL at 21:13

## 2024-02-21 RX ADMIN — Medication 250 MILLIGRAM(S): at 17:53

## 2024-02-21 RX ADMIN — SERTRALINE 25 MILLIGRAM(S): 25 TABLET, FILM COATED ORAL at 09:54

## 2024-02-21 RX ADMIN — Medication 500 MILLIGRAM(S): at 22:50

## 2024-02-21 RX ADMIN — OXYCODONE HYDROCHLORIDE 10 MILLIGRAM(S): 5 TABLET ORAL at 16:15

## 2024-02-21 RX ADMIN — LACTULOSE 10 GRAM(S): 10 SOLUTION ORAL at 10:03

## 2024-02-21 RX ADMIN — Medication 1000 MILLIGRAM(S): at 05:35

## 2024-02-21 RX ADMIN — Medication 325 MILLIGRAM(S): at 09:54

## 2024-02-21 RX ADMIN — OXYCODONE HYDROCHLORIDE 10 MILLIGRAM(S): 5 TABLET ORAL at 17:05

## 2024-02-21 RX ADMIN — Medication 25 MILLIGRAM(S): at 09:54

## 2024-02-21 RX ADMIN — Medication 81 MILLIGRAM(S): at 09:54

## 2024-02-21 RX ADMIN — Medication 1000 MILLIGRAM(S): at 13:08

## 2024-02-21 RX ADMIN — HYDROMORPHONE HYDROCHLORIDE 0.5 MILLIGRAM(S): 2 INJECTION INTRAMUSCULAR; INTRAVENOUS; SUBCUTANEOUS at 06:24

## 2024-02-21 RX ADMIN — APIXABAN 2.5 MILLIGRAM(S): 2.5 TABLET, FILM COATED ORAL at 01:08

## 2024-02-21 NOTE — PROGRESS NOTE ADULT - SUBJECTIVE AND OBJECTIVE BOX
This is a 79y/o Female s/p Right Total Knee Arthroplasty POD 1.  Pain is controlled. Pt feeling well. No nausea or vomiting.    Vital Signs (24 Hrs):  T(C): 36.4 (02-21-24 @ 04:05), Max: 36.6 (02-20-24 @ 14:12)  HR: 63 (02-21-24 @ 04:05) (42 - 92)  BP: 126/78 (02-21-24 @ 04:05) (108/84 - 174/113)  RR: 17 (02-21-24 @ 04:05) (12 - 25)  SpO2: 99% (02-21-24 @ 04:05) (98% - 100%)  Wt(kg): --    LABS:                          12.5   7.36  )-----------( 176      ( 20 Feb 2024 17:03 )             37.7     02-20    136  |  104  |  28<H>  ----------------------------<  145<H>  4.1   |  27  |  1.08    Ca    9.4      20 Feb 2024 17:03      Physical Exam:  General:  NAD AAOx3.  Musculoskeletal:  Right Knee:  Dressing clean, dry and intact.  SCDs in place.  Calves are soft and nontender.  Moving all toes and ankle, +EHL/FHL/TA/GS.  SILT throughout.  DP and PT pulses faint, dopplerable.    A/P:  Stable POD 1 Right Total Knee Arthroplasty  -Analgesia  -RLE elevation  -Ice application  -Prophylactic antibiotics  -DVT PE prophylaxis  -SCDs  -Incentive spirometry  -OOB PT Protected WBAT RLE for transfers  -Knee immobilizer while sleeping at night  -Dispo LTC v CANDY

## 2024-02-21 NOTE — PROGRESS NOTE ADULT - SUBJECTIVE AND OBJECTIVE BOX
PCP:  Dr Geni Lauren  Cardio: Dr. Reed    CHIEF COMPLAINT:     HISTORY OF THE PRESENT ILLNESS: this is a 78  female  with PMH of afib on eliquis,  HTN, HLD, freq falls, chronic pain syndrome, Cognitive communication deficit ( per chart review etiology is not documented) AAA, H/o ETOH abuse, denies any alcohol intake in a few years per H &P, now lives in a facility,  depression, OP, PVD with left middle toe amputations and OA of her right knee, with pain on ambulation.  Per Facility where she resides, pt was getting around via wheelchair.   s/p right TKR, awake, alert, answering questions, VSS, IN NAD, denies any CP or SOB.  We are consulted for medical management    Vital Signs Last 24 Hrs  T(C): 36.4 (21 Feb 2024 12:00), Max: 36.7 (21 Feb 2024 08:02)  T(F): 97.5 (21 Feb 2024 12:00), Max: 98.1 (21 Feb 2024 08:02)  HR: 66 (21 Feb 2024 12:00) (63 - 92)  BP: 111/64 (21 Feb 2024 12:00) (108/84 - 142/91)  BP(mean): 91 (20 Feb 2024 18:20) (91 - 91)  RR: 17 (21 Feb 2024 12:00) (12 - 25)  SpO2: 98% (21 Feb 2024 12:00) (98% - 100%)    Parameters below as of 21 Feb 2024 12:00  Patient On (Oxygen Delivery Method): room air      REVIEW OF SYSTEMS:   All 10 systems reviewed in detailed and found to be negative with the exception of what has already been described above    MEDICATIONS  (STANDING):  acetaminophen     Tablet .. 1000 milliGRAM(s) Oral every 8 hours  apixaban 5 milliGRAM(s) Oral every 12 hours  ascorbic acid 500 milliGRAM(s) Oral two times a day  aspirin enteric coated 81 milliGRAM(s) Oral daily  celecoxib 200 milliGRAM(s) Oral every 12 hours  cephalexin 250 milliGRAM(s) Oral every 6 hours  ferrous    sulfate 325 milliGRAM(s) Oral daily  folic acid 1 milliGRAM(s) Oral daily  lactated ringers. 1000 milliLiter(s) (75 mL/Hr) IV Continuous <Continuous>  lactulose Syrup 10 Gram(s) Oral daily  metoprolol succinate ER 25 milliGRAM(s) Oral daily  multivitamin 1 Tablet(s) Oral daily  pantoprazole    Tablet 40 milliGRAM(s) Oral daily  polyethylene glycol 3350 17 Gram(s) Oral at bedtime  senna 2 Tablet(s) Oral at bedtime  sertraline 25 milliGRAM(s) Oral daily  tranexamic acid IVPB 1000 milliGRAM(s) IV Intermittent once  tranexamic acid IVPB 1000 milliGRAM(s) IV Intermittent once    MEDICATIONS  (PRN):  HYDROmorphone  Injectable 0.5 milliGRAM(s) SubCutaneous every 4 hours PRN Severe Pain (7 - 10)  ondansetron Injectable 8 milliGRAM(s) IV Push every 8 hours PRN Nausea and/or Vomiting  oxyCODONE    IR 10 milliGRAM(s) Oral every 4 hours PRN Moderate Pain (4 - 6)  oxyCODONE    IR 5 milliGRAM(s) Oral every 4 hours PRN Mild Pain (1 - 3)  prochlorperazine   Injectable 10 milliGRAM(s) IV Push every 8 hours PRN Nausea and/or Vomiting    PHYSICAL EXAM:    GENERAL: Comfortable, no acute distress   HEAD:  Normocephalic, atraumatic  EYES: EOMI, PERRLA  HEENT: Moist mucous membranes  NECK: Supple, No JVD  NERVOUS SYSTEM:  Alert & Oriented X3, Motor Strength 5/5 B/L upper and lower extremities  CHEST/LUNG: Clear to auscultation bilaterally  HEART: Regular rate and rhythm  ABDOMEN: Soft, non tender, Nondistended, Bowel sounds present  GENITOURINARY: Voiding, no palpable bladder  EXTREMITIES:   No clubbing, cyanosis, or edema  MUSCULOSKELETAL right knee with brace, + brittany, awaiting return of sensation  SKIN-no rash      LABS:                         10.1   9.62  )-----------( 142      ( 21 Feb 2024 08:19 )             30.2       02-21    136  |  105  |  32<H>  ----------------------------<  143<H>  4.3   |  28  |  1.16    Ca    8.7      21 Feb 2024 08:19              IMPRESSION: 79 yo female with above pmh a/w:  # OA right knee  # S/P right TKR    POD#0  pain control  PT eval  Bowel regimen  IVF's  Finish ABXs  DASH diet  VTE prophylaxis  monitor CBC/BMP    #acute blood loss anemia  d/t surgery  no need for transfusion presently  monitor HH      # HTN  cont bb  pt at high risk for fluctuations in B/P 2/2 anesthesia, pain, hypovolemia and use of narcotics, placing pt at high risk for MI/CVA  monitor B/P closely  adjust anti-htn's accordingly    # afib, paroxysmal, CV#5  eliquis 5 mg po q12h   cont bb    #PVD  cont asa    #depression  cont sertraline    # Vte prophylaxis  eliquis   Venodynes  INC mobility      #advanced directives: per [facility] where pt resides, pt is a DNI/ DNR, no Molst included    dispo: rehab         PCP:  Dr Geni Lauren  Cardio: Dr. Reed    CHIEF COMPLAINT:     HISTORY OF THE PRESENT ILLNESS: this is a 78  female  with PMH of afib on eliquis,  HTN, HLD, freq falls, chronic pain syndrome, Cognitive communication deficit ( per chart review etiology is not documented) AAA, H/o ETOH abuse, denies any alcohol intake in a few years per H &P, now lives in a facility,  depression, OP, PVD with left middle toe amputations and OA of her right knee, with pain on ambulation.  Per Facility where she resides, pt was getting around via wheelchair.   s/p right TKR, awake, alert, answering questions, VSS, IN NAD, denies any CP or SOB.  We are consulted for medical management    Vital Signs Last 24 Hrs  T(C): 36.4 (21 Feb 2024 12:00), Max: 36.7 (21 Feb 2024 08:02)  T(F): 97.5 (21 Feb 2024 12:00), Max: 98.1 (21 Feb 2024 08:02)  HR: 66 (21 Feb 2024 12:00) (63 - 92)  BP: 111/64 (21 Feb 2024 12:00) (108/84 - 142/91)  BP(mean): 91 (20 Feb 2024 18:20) (91 - 91)  RR: 17 (21 Feb 2024 12:00) (12 - 25)  SpO2: 98% (21 Feb 2024 12:00) (98% - 100%)    Parameters below as of 21 Feb 2024 12:00  Patient On (Oxygen Delivery Method): room air      REVIEW OF SYSTEMS:   All 10 systems reviewed in detailed and found to be negative with the exception of what has already been described above    MEDICATIONS  (STANDING):  acetaminophen     Tablet .. 1000 milliGRAM(s) Oral every 8 hours  apixaban 5 milliGRAM(s) Oral every 12 hours  ascorbic acid 500 milliGRAM(s) Oral two times a day  aspirin enteric coated 81 milliGRAM(s) Oral daily  celecoxib 200 milliGRAM(s) Oral every 12 hours  cephalexin 250 milliGRAM(s) Oral every 6 hours  ferrous    sulfate 325 milliGRAM(s) Oral daily  folic acid 1 milliGRAM(s) Oral daily  lactated ringers. 1000 milliLiter(s) (75 mL/Hr) IV Continuous <Continuous>  lactulose Syrup 10 Gram(s) Oral daily  metoprolol succinate ER 25 milliGRAM(s) Oral daily  multivitamin 1 Tablet(s) Oral daily  pantoprazole    Tablet 40 milliGRAM(s) Oral daily  polyethylene glycol 3350 17 Gram(s) Oral at bedtime  senna 2 Tablet(s) Oral at bedtime  sertraline 25 milliGRAM(s) Oral daily  tranexamic acid IVPB 1000 milliGRAM(s) IV Intermittent once  tranexamic acid IVPB 1000 milliGRAM(s) IV Intermittent once    MEDICATIONS  (PRN):  HYDROmorphone  Injectable 0.5 milliGRAM(s) SubCutaneous every 4 hours PRN Severe Pain (7 - 10)  ondansetron Injectable 8 milliGRAM(s) IV Push every 8 hours PRN Nausea and/or Vomiting  oxyCODONE    IR 10 milliGRAM(s) Oral every 4 hours PRN Moderate Pain (4 - 6)  oxyCODONE    IR 5 milliGRAM(s) Oral every 4 hours PRN Mild Pain (1 - 3)  prochlorperazine   Injectable 10 milliGRAM(s) IV Push every 8 hours PRN Nausea and/or Vomiting    PHYSICAL EXAM:    GENERAL: Comfortable, no acute distress   HEAD:  Normocephalic, atraumatic  EYES: EOMI, PERRLA  HEENT: Moist mucous membranes  NECK: Supple, No JVD  NERVOUS SYSTEM:  Alert & Oriented X3, Motor Strength 5/5 B/L upper and lower extremities  CHEST/LUNG: Clear to auscultation bilaterally  HEART: Regular rate and rhythm  ABDOMEN: Soft, non tender, Nondistended, Bowel sounds present  GENITOURINARY: Voiding, no palpable bladder  EXTREMITIES:   No clubbing, cyanosis, or edema  MUSCULOSKELETAL right knee with brace, + brittany, awaiting return of sensation  SKIN-no rash      LABS:                         10.1   9.62  )-----------( 142      ( 21 Feb 2024 08:19 )             30.2       02-21    136  |  105  |  32<H>  ----------------------------<  143<H>  4.3   |  28  |  1.16    Ca    8.7      21 Feb 2024 08:19              IMPRESSION: 79 yo female with above pmh a/w:  # OA right knee  # S/P right TKR    POD#0  pain control  PT eval  Bowel regimen  IVF's  Finish ABXs  DASH diet  VTE prophylaxis  monitor CBC/BMP    #acute blood loss anemia  d/t surgery  no need for transfusion presently  monitor HH      # HTN  cont bb  pt at high risk for fluctuations in B/P 2/2 anesthesia, pain, hypovolemia and use of narcotics, placing pt at high risk for MI/CVA  monitor B/P closely  adjust anti-htn's accordingly    # afib, chronic, CV#5  eliquis 5 mg po q12h   cont bb    #PVD  cont asa    #depression  cont sertraline    # Vte prophylaxis  eliquis   Venodynes  INC mobility      #advanced directives: per [facility] where pt resides, pt is a DNI/ DNR, no Molst included    dispo: rehab         PCP:  Dr Geni Lauren  Cardio: Dr. Reed    CHIEF COMPLAINT:     HISTORY OF THE PRESENT ILLNESS: this is a 78  female  with PMH of afib on eliquis,  HTN, HLD, freq falls, chronic pain syndrome, Cognitive communication deficit ( per chart review etiology is not documented) AAA, H/o ETOH abuse, denies any alcohol intake in a few years per H &P, now lives in a facility,  depression, OP, PVD with left middle toe amputations and OA of her right knee, with pain on ambulation.  Per Facility where she resides, pt was getting around via wheelchair.   s/p right TKR, awake, alert, answering questions, VSS, IN NAD, denies any CP or SOB.  We are consulted for medical management    Vital Signs Last 24 Hrs  T(C): 36.4 (21 Feb 2024 12:00), Max: 36.7 (21 Feb 2024 08:02)  T(F): 97.5 (21 Feb 2024 12:00), Max: 98.1 (21 Feb 2024 08:02)  HR: 66 (21 Feb 2024 12:00) (63 - 92)  BP: 111/64 (21 Feb 2024 12:00) (108/84 - 142/91)  BP(mean): 91 (20 Feb 2024 18:20) (91 - 91)  RR: 17 (21 Feb 2024 12:00) (12 - 25)  SpO2: 98% (21 Feb 2024 12:00) (98% - 100%)    Parameters below as of 21 Feb 2024 12:00  Patient On (Oxygen Delivery Method): room air    1/21: pt seen at bedside, awake, alert, pleasant, no complaints, awaitng PT, denies any cp or sob      REVIEW OF SYSTEMS:   All 10 systems reviewed in detailed and found to be negative with the exception of what has already been described above    MEDICATIONS  (STANDING):  acetaminophen     Tablet .. 1000 milliGRAM(s) Oral every 8 hours  apixaban 5 milliGRAM(s) Oral every 12 hours  ascorbic acid 500 milliGRAM(s) Oral two times a day  aspirin enteric coated 81 milliGRAM(s) Oral daily  celecoxib 200 milliGRAM(s) Oral every 12 hours  cephalexin 250 milliGRAM(s) Oral every 6 hours  ferrous    sulfate 325 milliGRAM(s) Oral daily  folic acid 1 milliGRAM(s) Oral daily  lactated ringers. 1000 milliLiter(s) (75 mL/Hr) IV Continuous <Continuous>  lactulose Syrup 10 Gram(s) Oral daily  metoprolol succinate ER 25 milliGRAM(s) Oral daily  multivitamin 1 Tablet(s) Oral daily  pantoprazole    Tablet 40 milliGRAM(s) Oral daily  polyethylene glycol 3350 17 Gram(s) Oral at bedtime  senna 2 Tablet(s) Oral at bedtime  sertraline 25 milliGRAM(s) Oral daily  tranexamic acid IVPB 1000 milliGRAM(s) IV Intermittent once  tranexamic acid IVPB 1000 milliGRAM(s) IV Intermittent once    MEDICATIONS  (PRN):  HYDROmorphone  Injectable 0.5 milliGRAM(s) SubCutaneous every 4 hours PRN Severe Pain (7 - 10)  ondansetron Injectable 8 milliGRAM(s) IV Push every 8 hours PRN Nausea and/or Vomiting  oxyCODONE    IR 10 milliGRAM(s) Oral every 4 hours PRN Moderate Pain (4 - 6)  oxyCODONE    IR 5 milliGRAM(s) Oral every 4 hours PRN Mild Pain (1 - 3)  prochlorperazine   Injectable 10 milliGRAM(s) IV Push every 8 hours PRN Nausea and/or Vomiting    PHYSICAL EXAM:    GENERAL: Comfortable, no acute distress   HEAD:  Normocephalic, atraumatic  EYES: EOMI, PERRLA  HEENT: Moist mucous membranes  NECK: Supple, No JVD  NERVOUS SYSTEM:  Alert & Oriented X3, Motor Strength 5/5 B/L upper and lower extremities  CHEST/LUNG: Clear to auscultation bilaterally  HEART: Regular rate and rhythm  ABDOMEN: Soft, non tender, Nondistended, Bowel sounds present  GENITOURINARY: Voiding, no palpable bladder  EXTREMITIES:   No clubbing, cyanosis, or edema  MUSCULOSKELETAL right knee with brace, + brittany, + dorsi/plantar flexion  SKIN-no rash      LABS:                         10.1   9.62  )-----------( 142      ( 21 Feb 2024 08:19 )             30.2       02-21    136  |  105  |  32<H>  ----------------------------<  143<H>  4.3   |  28  |  1.16    Ca    8.7      21 Feb 2024 08:19              IMPRESSION: 77 yo female with above pmh a/w:  # OA right knee  # S/P right TKR    POD#1  pain control  PT eval  Bowel regimen  IVF's  Finish ABXs  DASH diet  VTE prophylaxis  monitor CBC/BMP    #acute blood loss anemia  d/t surgery  no need for transfusion presently  monitor HH      # HTN  cont bb  pt at high risk for fluctuations in B/P 2/2 anesthesia, pain, hypovolemia and use of narcotics, placing pt at high risk for MI/CVA  monitor B/P closely  adjust anti-htn's accordingly    # afib, chronic, CV#5  eliquis 5 mg po q12h   cont bb    #PVD  cont asa    #depression  cont sertraline    # Vte prophylaxis  eliquis   Venodynes  INC mobility      #advanced directives: per [facility] where pt resides, pt is a DNI/ DNR, no Molst included on paperwork from [facility]    dispo: rehab

## 2024-02-21 NOTE — SBIRT NOTE ADULT - NSSBIRTUNABLESCROTHER_GEN_A_CORE
Sw received SBIRT consult. Per EMR, pt self reports to be a recovering alcoholic and stopped drinking alcohol 2 years ago. Pt currently lives in nursing home per EMR. No concerns for ITZEL at this time. Sw to close out consult.

## 2024-02-22 LAB
ANION GAP SERPL CALC-SCNC: 5 MMOL/L — SIGNIFICANT CHANGE UP (ref 5–17)
BUN SERPL-MCNC: 37 MG/DL — HIGH (ref 7–23)
CALCIUM SERPL-MCNC: 8.6 MG/DL — SIGNIFICANT CHANGE UP (ref 8.5–10.1)
CHLORIDE SERPL-SCNC: 104 MMOL/L — SIGNIFICANT CHANGE UP (ref 96–108)
CO2 SERPL-SCNC: 27 MMOL/L — SIGNIFICANT CHANGE UP (ref 22–31)
CREAT SERPL-MCNC: 1.42 MG/DL — HIGH (ref 0.5–1.3)
EGFR: 38 ML/MIN/1.73M2 — LOW
GLUCOSE SERPL-MCNC: 110 MG/DL — HIGH (ref 70–99)
HCT VFR BLD CALC: 26 % — LOW (ref 34.5–45)
HCT VFR BLD CALC: 27.8 % — LOW (ref 34.5–45)
HGB BLD-MCNC: 8.7 G/DL — LOW (ref 11.5–15.5)
HGB BLD-MCNC: 9.1 G/DL — LOW (ref 11.5–15.5)
MCHC RBC-ENTMCNC: 30.4 PG — SIGNIFICANT CHANGE UP (ref 27–34)
MCHC RBC-ENTMCNC: 31.1 PG — SIGNIFICANT CHANGE UP (ref 27–34)
MCHC RBC-ENTMCNC: 32.7 GM/DL — SIGNIFICANT CHANGE UP (ref 32–36)
MCHC RBC-ENTMCNC: 33.5 GM/DL — SIGNIFICANT CHANGE UP (ref 32–36)
MCV RBC AUTO: 92.9 FL — SIGNIFICANT CHANGE UP (ref 80–100)
MCV RBC AUTO: 93 FL — SIGNIFICANT CHANGE UP (ref 80–100)
PLATELET # BLD AUTO: 119 K/UL — LOW (ref 150–400)
PLATELET # BLD AUTO: 131 K/UL — LOW (ref 150–400)
POTASSIUM SERPL-MCNC: 3.6 MMOL/L — SIGNIFICANT CHANGE UP (ref 3.5–5.3)
POTASSIUM SERPL-SCNC: 3.6 MMOL/L — SIGNIFICANT CHANGE UP (ref 3.5–5.3)
RBC # BLD: 2.8 M/UL — LOW (ref 3.8–5.2)
RBC # BLD: 2.99 M/UL — LOW (ref 3.8–5.2)
RBC # FLD: 13.2 % — SIGNIFICANT CHANGE UP (ref 10.3–14.5)
RBC # FLD: 13.3 % — SIGNIFICANT CHANGE UP (ref 10.3–14.5)
SODIUM SERPL-SCNC: 136 MMOL/L — SIGNIFICANT CHANGE UP (ref 135–145)
WBC # BLD: 5.81 K/UL — SIGNIFICANT CHANGE UP (ref 3.8–10.5)
WBC # BLD: 7 K/UL — SIGNIFICANT CHANGE UP (ref 3.8–10.5)
WBC # FLD AUTO: 5.81 K/UL — SIGNIFICANT CHANGE UP (ref 3.8–10.5)
WBC # FLD AUTO: 7 K/UL — SIGNIFICANT CHANGE UP (ref 3.8–10.5)

## 2024-02-22 PROCEDURE — 99232 SBSQ HOSP IP/OBS MODERATE 35: CPT

## 2024-02-22 RX ORDER — SODIUM CHLORIDE 9 MG/ML
500 INJECTION INTRAMUSCULAR; INTRAVENOUS; SUBCUTANEOUS ONCE
Refills: 0 | Status: DISCONTINUED | OUTPATIENT
Start: 2024-02-22 | End: 2024-02-22

## 2024-02-22 RX ORDER — ONDANSETRON 8 MG/1
1 TABLET, FILM COATED ORAL
Qty: 15 | Refills: 0
Start: 2024-02-22 | End: 2024-02-26

## 2024-02-22 RX ORDER — SODIUM CHLORIDE 9 MG/ML
500 INJECTION INTRAMUSCULAR; INTRAVENOUS; SUBCUTANEOUS
Refills: 0 | Status: DISCONTINUED | OUTPATIENT
Start: 2024-02-22 | End: 2024-02-23

## 2024-02-22 RX ORDER — CELECOXIB 200 MG/1
1 CAPSULE ORAL
Qty: 60 | Refills: 0
Start: 2024-02-22 | End: 2024-03-22

## 2024-02-22 RX ADMIN — Medication 81 MILLIGRAM(S): at 09:35

## 2024-02-22 RX ADMIN — Medication 1000 MILLIGRAM(S): at 13:29

## 2024-02-22 RX ADMIN — OXYCODONE HYDROCHLORIDE 5 MILLIGRAM(S): 5 TABLET ORAL at 11:06

## 2024-02-22 RX ADMIN — LACTULOSE 10 GRAM(S): 10 SOLUTION ORAL at 09:37

## 2024-02-22 RX ADMIN — APIXABAN 5 MILLIGRAM(S): 2.5 TABLET, FILM COATED ORAL at 21:01

## 2024-02-22 RX ADMIN — OXYCODONE HYDROCHLORIDE 5 MILLIGRAM(S): 5 TABLET ORAL at 11:55

## 2024-02-22 RX ADMIN — HYDROMORPHONE HYDROCHLORIDE 0.5 MILLIGRAM(S): 2 INJECTION INTRAMUSCULAR; INTRAVENOUS; SUBCUTANEOUS at 00:38

## 2024-02-22 RX ADMIN — Medication 500 MILLIGRAM(S): at 21:02

## 2024-02-22 RX ADMIN — OXYCODONE HYDROCHLORIDE 10 MILLIGRAM(S): 5 TABLET ORAL at 21:33

## 2024-02-22 RX ADMIN — OXYCODONE HYDROCHLORIDE 10 MILLIGRAM(S): 5 TABLET ORAL at 21:03

## 2024-02-22 RX ADMIN — Medication 500 MILLIGRAM(S): at 09:37

## 2024-02-22 RX ADMIN — Medication 1000 MILLIGRAM(S): at 06:23

## 2024-02-22 RX ADMIN — PANTOPRAZOLE SODIUM 40 MILLIGRAM(S): 20 TABLET, DELAYED RELEASE ORAL at 09:37

## 2024-02-22 RX ADMIN — Medication 1 MILLIGRAM(S): at 09:37

## 2024-02-22 RX ADMIN — SODIUM CHLORIDE 500 MILLILITER(S): 9 INJECTION INTRAMUSCULAR; INTRAVENOUS; SUBCUTANEOUS at 10:51

## 2024-02-22 RX ADMIN — Medication 1000 MILLIGRAM(S): at 21:03

## 2024-02-22 RX ADMIN — Medication 1 TABLET(S): at 09:36

## 2024-02-22 RX ADMIN — HYDROMORPHONE HYDROCHLORIDE 0.5 MILLIGRAM(S): 2 INJECTION INTRAMUSCULAR; INTRAVENOUS; SUBCUTANEOUS at 00:53

## 2024-02-22 RX ADMIN — APIXABAN 5 MILLIGRAM(S): 2.5 TABLET, FILM COATED ORAL at 09:35

## 2024-02-22 RX ADMIN — SERTRALINE 25 MILLIGRAM(S): 25 TABLET, FILM COATED ORAL at 09:36

## 2024-02-22 RX ADMIN — Medication 25 MILLIGRAM(S): at 09:35

## 2024-02-22 RX ADMIN — Medication 250 MILLIGRAM(S): at 00:40

## 2024-02-22 RX ADMIN — SENNA PLUS 2 TABLET(S): 8.6 TABLET ORAL at 21:01

## 2024-02-22 RX ADMIN — Medication 250 MILLIGRAM(S): at 13:14

## 2024-02-22 RX ADMIN — Medication 250 MILLIGRAM(S): at 06:23

## 2024-02-22 RX ADMIN — Medication 250 MILLIGRAM(S): at 18:13

## 2024-02-22 RX ADMIN — Medication 325 MILLIGRAM(S): at 09:37

## 2024-02-22 RX ADMIN — Medication 1000 MILLIGRAM(S): at 13:14

## 2024-02-22 NOTE — PROGRESS NOTE ADULT - SUBJECTIVE AND OBJECTIVE BOX
PCP:  Dr Geni Lauren  Cardio: Dr. Reed    CHIEF COMPLAINT:     HISTORY OF THE PRESENT ILLNESS: this is a 78  female  with PMH of afib on eliquis,  HTN, HLD, freq falls, chronic pain syndrome, Cognitive communication deficit ( per chart review etiology is not documented) AAA, H/o ETOH abuse, denies any alcohol intake in a few years per H &P, now lives in a facility,  depression, OP, PVD with left middle toe amputations and OA of her right knee, with pain on ambulation.  Per Facility where she resides, pt was getting around via wheelchair.   s/p right TKR, awake, alert, answering questions, VSS, IN NAD, denies any CP or SOB.  We are consulted for medical management    Pt seen at bedside, eating breakfast, comfortable, no cp or sob    REVIEW OF SYSTEMS:   All 10 systems reviewed in detailed and found to be negative with the exception of what has already been described above    MEDICATIONS  (STANDING):  acetaminophen     Tablet .. 1000 milliGRAM(s) Oral every 8 hours  apixaban 5 milliGRAM(s) Oral every 12 hours  ascorbic acid 500 milliGRAM(s) Oral two times a day  aspirin enteric coated 81 milliGRAM(s) Oral daily  cephalexin 250 milliGRAM(s) Oral every 6 hours  ferrous    sulfate 325 milliGRAM(s) Oral daily  folic acid 1 milliGRAM(s) Oral daily  lactated ringers. 1000 milliLiter(s) (75 mL/Hr) IV Continuous <Continuous>  lactulose Syrup 10 Gram(s) Oral daily  metoprolol succinate ER 25 milliGRAM(s) Oral daily  multivitamin 1 Tablet(s) Oral daily  pantoprazole    Tablet 40 milliGRAM(s) Oral daily  polyethylene glycol 3350 17 Gram(s) Oral at bedtime  senna 2 Tablet(s) Oral at bedtime  sertraline 25 milliGRAM(s) Oral daily  sodium chloride 0.9%. 500 milliLiter(s) (500 mL/Hr) IV Continuous <Continuous>  tranexamic acid IVPB 1000 milliGRAM(s) IV Intermittent once  tranexamic acid IVPB 1000 milliGRAM(s) IV Intermittent once    MEDICATIONS  (PRN):  bisacodyl Suppository 10 milliGRAM(s) Rectal once PRN Constipation  HYDROmorphone  Injectable 0.5 milliGRAM(s) SubCutaneous every 4 hours PRN Severe Pain (7 - 10)  ondansetron Injectable 8 milliGRAM(s) IV Push every 8 hours PRN Nausea and/or Vomiting  oxyCODONE    IR 5 milliGRAM(s) Oral every 4 hours PRN Mild Pain (1 - 3)  oxyCODONE    IR 10 milliGRAM(s) Oral every 4 hours PRN Moderate Pain (4 - 6)  prochlorperazine   Injectable 10 milliGRAM(s) IV Push every 8 hours PRN Nausea and/or Vomiting    PHYSICAL EXAM:    GENERAL: Comfortable, no acute distress   HEAD:  Normocephalic, atraumatic  EYES: EOMI, PERRLA  HEENT: Moist mucous membranes  NECK: Supple, No JVD  NERVOUS SYSTEM:  Alert & Oriented X3, Motor Strength 5/5 B/L upper and lower extremities  CHEST/LUNG: Clear to auscultation bilaterally  HEART: Regular rate and rhythm  ABDOMEN: Soft, non tender, Nondistended, Bowel sounds present  GENITOURINARY: Voiding, no palpable bladder  EXTREMITIES:   No clubbing, cyanosis, or edema  MUSCULOSKELETAL right knee with brace, + brittany + dorsi/plantar flexion  SKIN-no rash      LABS:                                   8.7    5.81  )-----------( 119      ( 22 Feb 2024 08:17 )             26.0       02-22    136  |  104  |  37<H>  ----------------------------<  110<H>  3.6   |  27  |  1.42<H>    Ca    8.6      22 Feb 2024 08:17                    IMPRESSION: 79 yo female with above pmh a/w:  # OA right knee  # S/P right TKR    POD#2  pain control  PT eval  Bowel regimen  IVF's  Finish ABXs  DASH diet  VTE prophylaxis  monitor CBC/BMP    #CINTHIA, prerenal   most likely from hypovolemia  d/c nephrotoxic meds  bladder scanned for 134 cc  will give  cc bolus  encourage po fluids    #acute blood loss anemia  d/t surgery  no need for transfusion presently  HGB 10.1---->8.7  no obvious s/s of bleeding  recheck HH at 1500      # HTN  cont bb  pt at high risk for fluctuations in B/P 2/2 anesthesia, pain, hypovolemia and use of narcotics, placing pt at high risk for MI/CVA  monitor B/P closely  adjust anti-htn's accordingly    # afib, chronic, CV#5  eliquis 5 mg po q12h   cont bb    #PVD  cont asa    #depression  cont sertraline    # Vte prophylaxis  eliquis   Venodynes  INC mobility      #advanced directives: per [facility] where pt resides, pt is a DNI/ DNR, no Molst included    dispo: rehab most likely tomorrow         PCP:  Dr Geni Lauren  Cardio: Dr. Reed    CHIEF COMPLAINT:     HISTORY OF THE PRESENT ILLNESS: this is a 78  female  with PMH of afib on eliquis,  HTN, HLD, freq falls, chronic pain syndrome, Cognitive communication deficit ( per chart review etiology is not documented) AAA, H/o ETOH abuse, denies any alcohol intake in a few years per H &P, now lives in a facility,  depression, OP, PVD with left middle toe amputations and OA of her right knee, with pain on ambulation.  Per Facility where she resides, pt was getting around via wheelchair.   s/p right TKR, awake, alert, answering questions, VSS, IN NAD, denies any CP or SOB.  We are consulted for medical management    Pt seen at bedside, eating breakfast, comfortable, no cp or sob    REVIEW OF SYSTEMS:   All 10 systems reviewed in detailed and found to be negative with the exception of what has already been described above    MEDICATIONS  (STANDING):  acetaminophen     Tablet .. 1000 milliGRAM(s) Oral every 8 hours  apixaban 5 milliGRAM(s) Oral every 12 hours  ascorbic acid 500 milliGRAM(s) Oral two times a day  aspirin enteric coated 81 milliGRAM(s) Oral daily  cephalexin 250 milliGRAM(s) Oral every 6 hours  ferrous    sulfate 325 milliGRAM(s) Oral daily  folic acid 1 milliGRAM(s) Oral daily  lactated ringers. 1000 milliLiter(s) (75 mL/Hr) IV Continuous <Continuous>  lactulose Syrup 10 Gram(s) Oral daily  metoprolol succinate ER 25 milliGRAM(s) Oral daily  multivitamin 1 Tablet(s) Oral daily  pantoprazole    Tablet 40 milliGRAM(s) Oral daily  polyethylene glycol 3350 17 Gram(s) Oral at bedtime  senna 2 Tablet(s) Oral at bedtime  sertraline 25 milliGRAM(s) Oral daily  sodium chloride 0.9%. 500 milliLiter(s) (500 mL/Hr) IV Continuous <Continuous>  tranexamic acid IVPB 1000 milliGRAM(s) IV Intermittent once  tranexamic acid IVPB 1000 milliGRAM(s) IV Intermittent once    MEDICATIONS  (PRN):  bisacodyl Suppository 10 milliGRAM(s) Rectal once PRN Constipation  HYDROmorphone  Injectable 0.5 milliGRAM(s) SubCutaneous every 4 hours PRN Severe Pain (7 - 10)  ondansetron Injectable 8 milliGRAM(s) IV Push every 8 hours PRN Nausea and/or Vomiting  oxyCODONE    IR 5 milliGRAM(s) Oral every 4 hours PRN Mild Pain (1 - 3)  oxyCODONE    IR 10 milliGRAM(s) Oral every 4 hours PRN Moderate Pain (4 - 6)  prochlorperazine   Injectable 10 milliGRAM(s) IV Push every 8 hours PRN Nausea and/or Vomiting    PHYSICAL EXAM:    GENERAL: Comfortable, no acute distress   HEAD:  Normocephalic, atraumatic  EYES: EOMI, PERRLA  HEENT: Moist mucous membranes  NECK: Supple, No JVD  NERVOUS SYSTEM:  Alert & Oriented X3, Motor Strength 5/5 B/L upper and lower extremities  CHEST/LUNG: Clear to auscultation bilaterally  HEART: Regular rate and rhythm  ABDOMEN: Soft, non tender, Nondistended, Bowel sounds present  GENITOURINARY: Voiding, no palpable bladder  EXTREMITIES:   No clubbing, cyanosis, or edema  MUSCULOSKELETAL right knee with brace, + brittany + dorsi/plantar flexion  SKIN-no rash      LABS:                                   8.7    5.81  )-----------( 119      ( 22 Feb 2024 08:17 )             26.0       02-22    136  |  104  |  37<H>  ----------------------------<  110<H>  3.6   |  27  |  1.42<H>    Ca    8.6      22 Feb 2024 08:17                    IMPRESSION: 77 yo female with above pmh a/w:  # OA right knee  # S/P right TKR    POD#2  pain control  PT eval  Bowel regimen  IVF's  Finish ABXs  DASH diet  VTE prophylaxis  monitor CBC/BMP    #CINTHIA, prerenal   most likely from hypovolemia  d/c nephrotoxic meds  bladder scanned for 134 cc  will give  cc bolus  encourage po fluids    #acute blood loss anemia  d/t surgery  no need for transfusion presently  HGB 10.1---->8.7  no obvious s/s of bleeding  recheck HH at 1500      # HTN  cont bb  pt at high risk for fluctuations in B/P 2/2 anesthesia, pain, hypovolemia and use of narcotics, placing pt at high risk for MI/CVA  monitor B/P closely  adjust anti-htn's accordingly    # afib, chronic, CV#5  eliquis 5 mg po q12h   cont bb    #PVD  cont asa    #depression  cont sertraline    # Vte prophylaxis  eliquis   Venodynes  INC mobility      #advanced directives: per [facility] where pt resides, pt is a DNI/ DNR, no Molst included    dispo: rehab most likely tomorrow if creat improves

## 2024-02-22 NOTE — PROGRESS NOTE ADULT - SUBJECTIVE AND OBJECTIVE BOX
This is a 79y/o Female s/p Right Total Knee Arthroplasty    Pain is controlled. Pt feeling well. No nausea or vomiting.    LABS:                        10.1   9.62  )-----------( 142      ( 21 Feb 2024 08:19 )             30.2     02-21    136  |  105  |  32<H>  ----------------------------<  143<H>  4.3   |  28  |  1.16    Ca    8.7      21 Feb 2024 08:19        Urinalysis Basic - ( 21 Feb 2024 08:19 )    Color: x / Appearance: x / SG: x / pH: x  Gluc: 143 mg/dL / Ketone: x  / Bili: x / Urobili: x   Blood: x / Protein: x / Nitrite: x   Leuk Esterase: x / RBC: x / WBC x   Sq Epi: x / Non Sq Epi: x / Bacteria: x        VITAL SIGNS:  T(C): 36.9 (02-22-24 @ 04:02), Max: 36.9 (02-22-24 @ 04:02)  HR: 71 (02-22-24 @ 04:02) (58 - 72)  BP: 111/61 (02-22-24 @ 04:02) (97/63 - 129/60)  RR: 16 (02-22-24 @ 04:02) (16 - 17)  SpO2: 99% (02-22-24 @ 04:02) (96% - 100%)      Physical Exam:  General:  NAD AAOx3.  Musculoskeletal:  Right Knee:  Dressing clean, dry and intact.  SCDs in place.  Calves are soft and nontender.  Moving all toes and ankle, +EHL/FHL/TA/GS.  SILT throughout.  DP and PT pulses faint, dopplerable.    A/P:  Stable POD 2 Right Total Knee Arthroplasty  -Analgesia  -RLE elevation  -Ice application  -Prophylactic antibiotics  -DVT PE prophylaxis  -SCDs  -Incentive spirometry  -OOB PT Protected WBAT RLE for transfers  -Knee immobilizer while sleeping at night  -Dispo [facility] v [facility]

## 2024-02-23 ENCOUNTER — TRANSCRIPTION ENCOUNTER (OUTPATIENT)
Age: 79
End: 2024-02-23

## 2024-02-23 VITALS
HEART RATE: 95 BPM | OXYGEN SATURATION: 98 % | RESPIRATION RATE: 16 BRPM | TEMPERATURE: 98 F | SYSTOLIC BLOOD PRESSURE: 135 MMHG | DIASTOLIC BLOOD PRESSURE: 84 MMHG

## 2024-02-23 LAB
BUN SERPL-MCNC: 33 MG/DL — HIGH (ref 7–23)
CALCIUM SERPL-MCNC: 8.7 MG/DL — SIGNIFICANT CHANGE UP (ref 8.5–10.1)
CHLORIDE SERPL-SCNC: 107 MMOL/L — SIGNIFICANT CHANGE UP (ref 96–108)
CO2 SERPL-SCNC: 31 MMOL/L — SIGNIFICANT CHANGE UP (ref 22–31)
CREAT SERPL-MCNC: 1.14 MG/DL — SIGNIFICANT CHANGE UP (ref 0.5–1.3)
EGFR: 50 ML/MIN/1.73M2 — LOW
GLUCOSE SERPL-MCNC: 107 MG/DL — HIGH (ref 70–99)
HCT VFR BLD CALC: 25.8 % — LOW (ref 34.5–45)
HGB BLD-MCNC: 8.4 G/DL — LOW (ref 11.5–15.5)
MCHC RBC-ENTMCNC: 30.5 PG — SIGNIFICANT CHANGE UP (ref 27–34)
MCHC RBC-ENTMCNC: 32.6 GM/DL — SIGNIFICANT CHANGE UP (ref 32–36)
MCV RBC AUTO: 93.8 FL — SIGNIFICANT CHANGE UP (ref 80–100)
PLATELET # BLD AUTO: 122 K/UL — LOW (ref 150–400)
POTASSIUM SERPL-MCNC: 3.8 MMOL/L — SIGNIFICANT CHANGE UP (ref 3.5–5.3)
POTASSIUM SERPL-SCNC: 3.8 MMOL/L — SIGNIFICANT CHANGE UP (ref 3.5–5.3)
RBC # BLD: 2.75 M/UL — LOW (ref 3.8–5.2)
RBC # FLD: 13.6 % — SIGNIFICANT CHANGE UP (ref 10.3–14.5)
SODIUM SERPL-SCNC: 138 MMOL/L — SIGNIFICANT CHANGE UP (ref 135–145)
WBC # BLD: 5.25 K/UL — SIGNIFICANT CHANGE UP (ref 3.8–10.5)
WBC # FLD AUTO: 5.25 K/UL — SIGNIFICANT CHANGE UP (ref 3.8–10.5)

## 2024-02-23 RX ADMIN — OXYCODONE HYDROCHLORIDE 10 MILLIGRAM(S): 5 TABLET ORAL at 11:59

## 2024-02-23 RX ADMIN — Medication 25 MILLIGRAM(S): at 12:01

## 2024-02-23 RX ADMIN — Medication 250 MILLIGRAM(S): at 11:59

## 2024-02-23 RX ADMIN — Medication 1000 MILLIGRAM(S): at 06:23

## 2024-02-23 RX ADMIN — Medication 1 MILLIGRAM(S): at 12:01

## 2024-02-23 RX ADMIN — Medication 250 MILLIGRAM(S): at 00:32

## 2024-02-23 RX ADMIN — OXYCODONE HYDROCHLORIDE 10 MILLIGRAM(S): 5 TABLET ORAL at 12:59

## 2024-02-23 RX ADMIN — Medication 500 MILLIGRAM(S): at 12:01

## 2024-02-23 RX ADMIN — Medication 250 MILLIGRAM(S): at 06:21

## 2024-02-23 RX ADMIN — PANTOPRAZOLE SODIUM 40 MILLIGRAM(S): 20 TABLET, DELAYED RELEASE ORAL at 12:01

## 2024-02-23 RX ADMIN — APIXABAN 5 MILLIGRAM(S): 2.5 TABLET, FILM COATED ORAL at 12:01

## 2024-02-23 RX ADMIN — SERTRALINE 25 MILLIGRAM(S): 25 TABLET, FILM COATED ORAL at 12:01

## 2024-02-23 RX ADMIN — OXYCODONE HYDROCHLORIDE 10 MILLIGRAM(S): 5 TABLET ORAL at 01:08

## 2024-02-23 RX ADMIN — Medication 1 TABLET(S): at 12:01

## 2024-02-23 RX ADMIN — Medication 325 MILLIGRAM(S): at 12:01

## 2024-02-23 RX ADMIN — Medication 81 MILLIGRAM(S): at 12:04

## 2024-02-23 RX ADMIN — OXYCODONE HYDROCHLORIDE 10 MILLIGRAM(S): 5 TABLET ORAL at 01:38

## 2024-02-23 NOTE — PROGRESS NOTE ADULT - SUBJECTIVE AND OBJECTIVE BOX
This is a 77y/o Female s/p Right Total Knee Arthroplasty    Pain is controlled. Pt feeling well. No nausea or vomiting.    Vital Signs (24 Hrs):  T(C): 36.7 (02-23-24 @ 04:12), Max: 36.7 (02-23-24 @ 04:12)  HR: 89 (02-23-24 @ 04:12) (53 - 89)  BP: 110/67 (02-23-24 @ 04:12) (102/73 - 131/79)  RR: 16 (02-23-24 @ 04:12) (16 - 16)  SpO2: 100% (02-23-24 @ 04:12) (95% - 100%)  Wt(kg): --    LABS:                          8.4    5.25  )-----------( 122      ( 23 Feb 2024 05:04 )             25.8     02-22    136  |  104  |  37<H>  ----------------------------<  110<H>  3.6   |  27  |  1.42<H>    Ca    8.6      22 Feb 2024 08:17    Physical Exam:  General:  NAD AAOx3.  Musculoskeletal:  Right Knee:  Dressing clean, dry and intact.  SCDs in place.  Calves are soft and nontender.  Moving all toes and ankle, +EHL/FHL/TA/GS.  SILT throughout.  DP and PT pulses faint, dopplerable.    A/P:  Stable POD 3 Right Total Knee Arthroplasty  -Analgesia  -RLE elevation  -Ice application  -Prophylactic antibiotics  -DVT PE prophylaxis  -SCDs  -Incentive spirometry  -OOB PT Protected WBAT RLE for transfers  -Knee immobilizer while sleeping at night  -Dispo LTC v CANDY

## 2024-02-23 NOTE — DISCHARGE NOTE NURSING/CASE MANAGEMENT/SOCIAL WORK - PATIENT PORTAL LINK FT
You can access the FollowMyHealth Patient Portal offered by [facility] by registering at the following website: [email or web address]. By joining [facility]’s FollowMyHealth portal, you will also be able to view your health information using other applications (apps) compatible with our system.

## 2024-02-25 ENCOUNTER — TRANSCRIPTION ENCOUNTER (OUTPATIENT)
Age: 79
End: 2024-02-25

## 2024-02-26 LAB — SURGICAL PATHOLOGY STUDY: SIGNIFICANT CHANGE UP

## 2024-02-28 DIAGNOSIS — Z66 DO NOT RESUSCITATE: ICD-10-CM

## 2024-02-28 DIAGNOSIS — Z79.01 LONG TERM (CURRENT) USE OF ANTICOAGULANTS: ICD-10-CM

## 2024-02-28 DIAGNOSIS — E78.5 HYPERLIPIDEMIA, UNSPECIFIED: ICD-10-CM

## 2024-02-28 DIAGNOSIS — M32.9 SYSTEMIC LUPUS ERYTHEMATOSUS, UNSPECIFIED: ICD-10-CM

## 2024-02-28 DIAGNOSIS — I48.20 CHRONIC ATRIAL FIBRILLATION, UNSPECIFIED: ICD-10-CM

## 2024-02-28 DIAGNOSIS — F32.A DEPRESSION, UNSPECIFIED: ICD-10-CM

## 2024-02-28 DIAGNOSIS — I10 ESSENTIAL (PRIMARY) HYPERTENSION: ICD-10-CM

## 2024-02-28 DIAGNOSIS — N17.9 ACUTE KIDNEY FAILURE, UNSPECIFIED: ICD-10-CM

## 2024-02-28 DIAGNOSIS — M17.11 UNILATERAL PRIMARY OSTEOARTHRITIS, RIGHT KNEE: ICD-10-CM

## 2024-02-28 DIAGNOSIS — Z89.422 ACQUIRED ABSENCE OF OTHER LEFT TOE(S): ICD-10-CM

## 2024-02-28 DIAGNOSIS — Z79.82 LONG TERM (CURRENT) USE OF ASPIRIN: ICD-10-CM

## 2024-02-28 DIAGNOSIS — D62 ACUTE POSTHEMORRHAGIC ANEMIA: ICD-10-CM

## 2024-03-15 ENCOUNTER — APPOINTMENT (OUTPATIENT)
Dept: ORTHOPEDIC SURGERY | Facility: CLINIC | Age: 79
End: 2024-03-15
Payer: COMMERCIAL

## 2024-03-15 VITALS
HEIGHT: 70 IN | BODY MASS INDEX: 20.47 KG/M2 | HEART RATE: 53 BPM | SYSTOLIC BLOOD PRESSURE: 122 MMHG | WEIGHT: 143 LBS | DIASTOLIC BLOOD PRESSURE: 73 MMHG

## 2024-03-15 PROCEDURE — 73560 X-RAY EXAM OF KNEE 1 OR 2: CPT | Mod: RT

## 2024-03-15 PROCEDURE — 99024 POSTOP FOLLOW-UP VISIT: CPT

## 2024-03-15 NOTE — HISTORY OF PRESENT ILLNESS
[TextEntry] : Patient here for follow-up status post right total knee replacement on 2/20/2024.  Is taking Tylenol, Celebrex, oxycodone for pain.  Is on DOAC for DVT prophylaxis.  Mostly in wheelchair but is going to the gym at her assisted living.  Right knee Incision healing well without signs of infection ROM  Stable to varus/valgus stress Minimal AP laxity in flexion Neurovascularly intact  2 views right knee-hardware in good alignment  Assessment/plan Patient doing well.  Continue physical therapy/encourage ambulation.  Encouraged working on ROM.  Recommend using knee immobilizer at night to help keep the leg straight.  Pain control as needed.  Patient to follow-up in 4 weeks.

## 2024-03-19 ENCOUNTER — APPOINTMENT (OUTPATIENT)
Dept: OPHTHALMOLOGY | Facility: CLINIC | Age: 79
End: 2024-03-19
Payer: COMMERCIAL

## 2024-03-19 ENCOUNTER — NON-APPOINTMENT (OUTPATIENT)
Age: 79
End: 2024-03-19

## 2024-03-19 PROCEDURE — 92083 EXTENDED VISUAL FIELD XM: CPT

## 2024-03-19 PROCEDURE — 92014 COMPRE OPH EXAM EST PT 1/>: CPT

## 2024-03-19 PROCEDURE — 92134 CPTRZ OPH DX IMG PST SGM RTA: CPT

## 2024-03-19 PROCEDURE — 76514 ECHO EXAM OF EYE THICKNESS: CPT

## 2024-03-20 ENCOUNTER — TRANSCRIPTION ENCOUNTER (OUTPATIENT)
Age: 79
End: 2024-03-20

## 2024-04-02 ENCOUNTER — TRANSCRIPTION ENCOUNTER (OUTPATIENT)
Age: 79
End: 2024-04-02

## 2024-04-25 ENCOUNTER — TRANSCRIPTION ENCOUNTER (OUTPATIENT)
Age: 79
End: 2024-04-25

## 2024-05-30 ENCOUNTER — APPOINTMENT (OUTPATIENT)
Dept: ORTHOPEDIC SURGERY | Facility: CLINIC | Age: 79
End: 2024-05-30
Payer: MEDICARE

## 2024-05-30 VITALS — HEIGHT: 70 IN | BODY MASS INDEX: 20.04 KG/M2 | WEIGHT: 140 LBS

## 2024-05-30 DIAGNOSIS — T84.82XA FIBROSIS DUE TO INTERNAL ORTHOPEDIC PROSTHETIC DEVICES, IMPLANTS AND GRAFTS, INITIAL ENCOUNTER: ICD-10-CM

## 2024-05-30 DIAGNOSIS — Z96.651 PRESENCE OF RIGHT ARTIFICIAL KNEE JOINT: ICD-10-CM

## 2024-05-30 DIAGNOSIS — M17.12 UNILATERAL PRIMARY OSTEOARTHRITIS, LEFT KNEE: ICD-10-CM

## 2024-05-30 PROCEDURE — G2211 COMPLEX E/M VISIT ADD ON: CPT

## 2024-05-30 PROCEDURE — 99214 OFFICE O/P EST MOD 30 MIN: CPT

## 2024-05-30 PROCEDURE — 73564 X-RAY EXAM KNEE 4 OR MORE: CPT | Mod: LT

## 2024-05-30 PROCEDURE — 73562 X-RAY EXAM OF KNEE 3: CPT | Mod: RT

## 2024-05-30 NOTE — HISTORY OF PRESENT ILLNESS
[TextEntry] : Patient here for follow-up status post [right] total knee replacement on 2/20/2024.  Is taking [Tylenol] for pain.  Is on [aspirin] for DVT prophylaxis.  Ambulating with a [cane].  [Right] knee Incision [healing well] without signs of infection ROM [0]-[100] Stable to varus/valgus stress Minimal AP laxity in flexion Neurovascularly intact  Assessment/plan Patient doing well.  Continue physical therapy/encourage ambulation, prescription for outpatient physical therapy given.  Encouraged working on ROM.  Pain control as needed.  Patient to follow-up in [3] weeks. [de-identified] : 5/30/24: Patient here for follow-up status post right total knee replacement on 2/20/2024. Is not taking anything for pain. Is on DOAC for DVT prophylaxis. Mostly in wheelchair.  States he stopped the gym as they rotate through the residents but she would like to return.  States she does not have pain but is unable to straighten her right leg.  States they did not keep the knee immobilizer on for very long when she was at the facility.  10/19/23: Patient presents with bilateral knee pain right worse than left. Is been going on for many years. States she has had injections in the past, believes they were steroids about 3 months ago but provided no relief recently. They did used to work. Taking Tylenol for the pain. Lives in an assisted living. States she has not ambulated for about a year, has been using a wheelchair due to the knee pain. Denies allergies anticoagulation tobacco use, past medical history -A-fib.

## 2024-05-30 NOTE — DISCUSSION/SUMMARY
[de-identified] : Patient has lost significant extension in the right knee even since last follow-up.  However patient does not have pain as she is mostly wheelchair-bound.  I do believe she should continue physical therapy, recommend at least a few times a week preferably 5 times per week.  Pain control as needed.  I did discuss we could do a revision to a hinged knee replacement on the right side to attempt to get her straight however there are no guarantees in and this is a large surgery especially considering she does not have pain.  At this time we will hold off on doing anything for the left knee as it not sure it would help her overall status either.  We discussed if she does have pain when she is at the gym in the left knee we could consider injections.  Patient is in agreement with plan.  She will follow-up in 3 months.

## 2024-05-30 NOTE — PHYSICAL EXAM
[de-identified] : Right knee ROM  Stable to varus/valgus stress Minimal AP laxity in flexion Neurovascularly intact  Left lower extremity Knee Inspection: no effusion Wounds: None Alignment: Valgus Palpation: Nontender to palpation ROM active (in degrees):  with crepitus/pain through the arc of motion Ligamentous laxity: Laxity with varus valgus stress Meniscal Test: Negative McMurrays, negative Rom. Muscle Test: good quad strength. [de-identified] : 5/30/24: 3 views right knee-hardware in good alignment Left knee xrays, standing AP/Lateral and Merchant films, and 45 degree PA standing view are reviewed and demonstrate diffuse tricompartmental degenerative arthritis, lateral joint space narrowing, significant valgus deformities, marginal osteophytes, bone on bone with sclerosis, patellofemoral joint space narrowing.

## 2024-09-09 ENCOUNTER — APPOINTMENT (OUTPATIENT)
Dept: ORTHOPEDIC SURGERY | Facility: CLINIC | Age: 79
End: 2024-09-09
Payer: MEDICARE

## 2024-09-09 DIAGNOSIS — Z96.651 PRESENCE OF RIGHT ARTIFICIAL KNEE JOINT: ICD-10-CM

## 2024-09-09 PROCEDURE — 99214 OFFICE O/P EST MOD 30 MIN: CPT

## 2024-09-09 PROCEDURE — 73562 X-RAY EXAM OF KNEE 3: CPT | Mod: RT

## 2024-09-09 NOTE — HISTORY OF PRESENT ILLNESS
[de-identified] : 9/9/24:Patient here for follow-up status post right total knee replacement on 2/20/2024.  Does not have pain in the knee however her knee is still very stiff and she has difficulty ambulating.  Mostly in a wheelchair does ambulate a bit with a walker.  She is disappointed that her functionality has not improved but she has had some pain relief.  5/30/24: Patient here for follow-up status post right total knee replacement on 2/20/2024. Is not taking anything for pain. Is on DOAC for DVT prophylaxis. Mostly in wheelchair.  States he stopped the gym as they rotate through the residents but she would like to return.  States she does not have pain but is unable to straighten her right leg.  States they did not keep the knee immobilizer on for very long when she was at the facility.  10/19/23: Patient presents with bilateral knee pain right worse than left. Is been going on for many years. States she has had injections in the past, believes they were steroids about 3 months ago but provided no relief recently. They did used to work. Taking Tylenol for the pain. Lives in an assisted living. States she has not ambulated for about a year, has been using a wheelchair due to the knee pain. Denies allergies anticoagulation tobacco use, past medical history -A-fib.

## 2024-09-09 NOTE — HISTORY OF PRESENT ILLNESS
[de-identified] : 9/9/24:Patient here for follow-up status post right total knee replacement on 2/20/2024.  Does not have pain in the knee however her knee is still very stiff and she has difficulty ambulating.  Mostly in a wheelchair does ambulate a bit with a walker.  She is disappointed that her functionality has not improved but she has had some pain relief.  5/30/24: Patient here for follow-up status post right total knee replacement on 2/20/2024. Is not taking anything for pain. Is on DOAC for DVT prophylaxis. Mostly in wheelchair.  States he stopped the gym as they rotate through the residents but she would like to return.  States she does not have pain but is unable to straighten her right leg.  States they did not keep the knee immobilizer on for very long when she was at the facility.  10/19/23: Patient presents with bilateral knee pain right worse than left. Is been going on for many years. States she has had injections in the past, believes they were steroids about 3 months ago but provided no relief recently. They did used to work. Taking Tylenol for the pain. Lives in an assisted living. States she has not ambulated for about a year, has been using a wheelchair due to the knee pain. Denies allergies anticoagulation tobacco use, past medical history -A-fib.

## 2024-09-09 NOTE — DISCUSSION/SUMMARY
[de-identified] : Patient has lost significant extension in the right knee even since immediate postop.  However patient does not have pain as she is mostly wheelchair-bound.  I do believe she should continue physical therapy, recommend at least a few times a week preferably 5 times per week.  Pain control as needed.  I did discuss we could do a revision to a hinged knee replacement on the right side to attempt to get her straight however there are no guarantees and this is a large surgery especially considering she does not have pain.  He will continue conservative treatments.  Patient will follow-up in 6 months.  My cumulative time spent on this patient's visit included: Preparation for the visit, review of the medical records, review of pertinent diagnostic studies, examination and counseling of the patient on the above diagnosis, treatment plan and prognosis, orders of diagnostic tests, medications and/or appropriate procedures and documentation in the medical records of today's visit. Time spent on separately reportable procedures is excluded.

## 2024-09-09 NOTE — PHYSICAL EXAM
[de-identified] : Right knee ROM  Stable to varus/valgus stress Minimal AP laxity in flexion Neurovascularly intact  Prior: Left lower extremity Knee Inspection: no effusion Wounds: None Alignment: Valgus Palpation: Nontender to palpation ROM active (in degrees):  with crepitus/pain through the arc of motion Ligamentous laxity: Laxity with varus valgus stress Meniscal Test: Negative McMurrays, negative Rom. Muscle Test: good quad strength. [de-identified] : 9/9/24:  5/30/24: 3 views right knee-hardware in good alignment Left knee xrays, standing AP/Lateral and Merchant films, and 45 degree PA standing view are reviewed and demonstrate diffuse tricompartmental degenerative arthritis, lateral joint space narrowing, significant valgus deformities, marginal osteophytes, bone on bone with sclerosis, patellofemoral joint space narrowing.

## 2024-09-09 NOTE — PHYSICAL EXAM
[de-identified] : Right knee ROM  Stable to varus/valgus stress Minimal AP laxity in flexion Neurovascularly intact  Prior: Left lower extremity Knee Inspection: no effusion Wounds: None Alignment: Valgus Palpation: Nontender to palpation ROM active (in degrees):  with crepitus/pain through the arc of motion Ligamentous laxity: Laxity with varus valgus stress Meniscal Test: Negative McMurrays, negative Rom. Muscle Test: good quad strength. [de-identified] : 9/9/24:  5/30/24: 3 views right knee-hardware in good alignment Left knee xrays, standing AP/Lateral and Merchant films, and 45 degree PA standing view are reviewed and demonstrate diffuse tricompartmental degenerative arthritis, lateral joint space narrowing, significant valgus deformities, marginal osteophytes, bone on bone with sclerosis, patellofemoral joint space narrowing.

## 2024-09-09 NOTE — DISCUSSION/SUMMARY
[de-identified] : Patient has lost significant extension in the right knee even since immediate postop.  However patient does not have pain as she is mostly wheelchair-bound.  I do believe she should continue physical therapy, recommend at least a few times a week preferably 5 times per week.  Pain control as needed.  I did discuss we could do a revision to a hinged knee replacement on the right side to attempt to get her straight however there are no guarantees and this is a large surgery especially considering she does not have pain.  He will continue conservative treatments.  Patient will follow-up in 6 months.  My cumulative time spent on this patient's visit included: Preparation for the visit, review of the medical records, review of pertinent diagnostic studies, examination and counseling of the patient on the above diagnosis, treatment plan and prognosis, orders of diagnostic tests, medications and/or appropriate procedures and documentation in the medical records of today's visit. Time spent on separately reportable procedures is excluded.

## 2024-09-24 ENCOUNTER — APPOINTMENT (OUTPATIENT)
Dept: OPHTHALMOLOGY | Facility: CLINIC | Age: 79
End: 2024-09-24